# Patient Record
Sex: FEMALE | Race: WHITE | NOT HISPANIC OR LATINO | Employment: OTHER | URBAN - METROPOLITAN AREA
[De-identification: names, ages, dates, MRNs, and addresses within clinical notes are randomized per-mention and may not be internally consistent; named-entity substitution may affect disease eponyms.]

---

## 2023-07-13 PROBLEM — J45.909 AB (ASTHMATIC BRONCHITIS) (HHS-HCC): Status: ACTIVE | Noted: 2023-07-13
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
## 2023-07-13 PROBLEM — R39.9 UTI SYMPTOMS: Status: ACTIVE | Noted: 2023-07-13

## 2023-07-13 PROBLEM — R42 VERTIGO: Status: ACTIVE | Noted: 2023-07-13

## 2023-07-13 PROBLEM — H52.203 ASTIGMATISM, BILATERAL: Status: ACTIVE | Noted: 2023-07-13

## 2023-07-13 PROBLEM — N81.10 VAGINAL PROLAPSE: Status: ACTIVE | Noted: 2023-07-13

## 2023-07-13 PROBLEM — J45.40 ASTHMA, MODERATE PERSISTENT (HHS-HCC): Status: ACTIVE | Noted: 2023-07-13

## 2023-07-13 PROBLEM — H25.13 NUCLEAR SCLEROSIS OF BOTH EYES: Status: ACTIVE | Noted: 2023-07-13

## 2023-07-13 PROBLEM — R51.9 CEPHALGIA: Status: ACTIVE | Noted: 2023-07-13

## 2023-07-13 PROBLEM — J06.9 ACUTE URI: Status: ACTIVE | Noted: 2023-07-13

## 2023-07-13 PROBLEM — S81.811A LACERATION OF RIGHT LOWER EXTREMITY: Status: ACTIVE | Noted: 2023-07-13

## 2023-07-13 PROBLEM — N39.46 MIXED INCONTINENCE URGE AND STRESS: Status: ACTIVE | Noted: 2023-07-13

## 2023-07-13 PROBLEM — H91.93 HEARING LOSS, BILATERAL: Status: ACTIVE | Noted: 2023-07-13

## 2023-07-13 PROBLEM — R53.81 MALAISE AND FATIGUE: Status: ACTIVE | Noted: 2023-07-13

## 2023-07-13 PROBLEM — H91.90 HEARING IMPAIRMENT: Status: ACTIVE | Noted: 2023-07-13

## 2023-07-13 PROBLEM — H52.03 HYPERMETROPIA OF BOTH EYES: Status: ACTIVE | Noted: 2023-07-13

## 2023-07-13 PROBLEM — H43.813 PVD (POSTERIOR VITREOUS DETACHMENT), BOTH EYES: Status: ACTIVE | Noted: 2023-07-13

## 2023-07-13 PROBLEM — R35.0 URINARY FREQUENCY: Status: ACTIVE | Noted: 2023-07-13

## 2023-07-13 PROBLEM — H60.92 LEFT OTITIS EXTERNA: Status: ACTIVE | Noted: 2023-07-13

## 2023-07-13 PROBLEM — R04.0 EPISTAXIS: Status: ACTIVE | Noted: 2023-07-13

## 2023-07-13 PROBLEM — N95.8 GENITOURINARY SYNDROME OF MENOPAUSE: Status: ACTIVE | Noted: 2023-07-13

## 2023-07-13 PROBLEM — H25.013 CORTICAL AGE-RELATED CATARACT OF BOTH EYES: Status: ACTIVE | Noted: 2023-07-13

## 2023-07-13 PROBLEM — R10.2 PELVIC PAIN IN FEMALE: Status: ACTIVE | Noted: 2023-07-13

## 2023-07-13 PROBLEM — K02.9 TOOTH CARIES: Status: ACTIVE | Noted: 2023-07-13

## 2023-07-13 PROBLEM — R53.83 MALAISE AND FATIGUE: Status: ACTIVE | Noted: 2023-07-13

## 2023-07-13 PROBLEM — H52.4 BILATERAL PRESBYOPIA: Status: ACTIVE | Noted: 2023-07-13

## 2023-07-13 PROBLEM — N39.0 UTI (URINARY TRACT INFECTION): Status: ACTIVE | Noted: 2023-07-13

## 2023-07-13 PROBLEM — S06.0X0A CONCUSSION W/O COMA: Status: ACTIVE | Noted: 2023-07-13

## 2023-07-13 RX ORDER — ALBUTEROL SULFATE 90 UG/1
INHALANT RESPIRATORY (INHALATION)
COMMUNITY
End: 2024-04-02 | Stop reason: SDUPTHER

## 2023-07-13 RX ORDER — ESTRADIOL 0.1 MG/G
CREAM VAGINAL
COMMUNITY
Start: 2021-10-29

## 2023-07-13 RX ORDER — BUDESONIDE AND FORMOTEROL FUMARATE DIHYDRATE 160; 4.5 UG/1; UG/1
2 AEROSOL RESPIRATORY (INHALATION) 2 TIMES DAILY
COMMUNITY
End: 2024-04-02 | Stop reason: SDUPTHER

## 2023-07-13 RX ORDER — FEXOFENADINE HCL AND PSEUDOEPHEDRINE HCI 60; 120 MG/1; MG/1
TABLET, EXTENDED RELEASE ORAL
COMMUNITY

## 2023-07-14 ENCOUNTER — TELEMEDICINE (OUTPATIENT)
Dept: PRIMARY CARE | Facility: CLINIC | Age: 68
End: 2023-07-14
Payer: MEDICARE

## 2023-07-14 DIAGNOSIS — Z00.00 GENERAL MEDICAL EXAM: ICD-10-CM

## 2023-07-14 DIAGNOSIS — M81.0 OSTEOPOROSIS, UNSPECIFIED OSTEOPOROSIS TYPE, UNSPECIFIED PATHOLOGICAL FRACTURE PRESENCE: ICD-10-CM

## 2023-07-14 DIAGNOSIS — J01.90 ACUTE SINUSITIS, RECURRENCE NOT SPECIFIED, UNSPECIFIED LOCATION: Primary | ICD-10-CM

## 2023-07-14 PROCEDURE — 99213 OFFICE O/P EST LOW 20 MIN: CPT | Performed by: FAMILY MEDICINE

## 2023-07-14 RX ORDER — IBANDRONATE SODIUM 150 MG/1
150 TABLET, FILM COATED ORAL
Qty: 1 TABLET | Refills: 11 | Status: SHIPPED | OUTPATIENT
Start: 2023-07-14 | End: 2024-07-13

## 2023-07-14 RX ORDER — IBANDRONATE SODIUM 150 MG/1
150 TABLET, FILM COATED ORAL ONCE
Status: DISCONTINUED | OUTPATIENT
Start: 2023-07-14 | End: 2023-07-14

## 2023-07-14 RX ORDER — AZITHROMYCIN 250 MG/1
TABLET, FILM COATED ORAL
Qty: 6 TABLET | Refills: 0 | Status: SHIPPED | OUTPATIENT
Start: 2023-07-14 | End: 2023-07-19

## 2023-07-14 NOTE — PATIENT INSTRUCTIONS
I called in antibiotics for the sinus infection.  I prescribed Boniva for osteoporosis.  Labs were ordered to be done fasting.

## 2023-07-14 NOTE — PROGRESS NOTES
Subjective   Patient ID: [id number]   Virtual or Telephone Consent    An interactive audio and video telecommunication system which permits real time communications between the patient (at the originating site) and provider (at the distant site) was utilized to provide this telehealth service.   Verbal consent was requested and obtained from Nicolle Campbell on this date, 07/14/23 for a telehealth visit.     Nicolle Campbell is a 68 y.o. female who presents for a possible sinus infection.    HPI    She has had sinus drainage and sinus pain for the last two weeks.  She has pain in the gums and the ears.  The drainage is causing her to cough.  She has had a low grade fever.      She is requesting a different osteoporosis medication stating that the present one, alendronate upsets her stomach.    Objective     There were no vitals taken for this visit.     Physical Exam  Constitutional:       Appearance: Normal appearance.   Pulmonary:      Effort: Pulmonary effort is normal. No respiratory distress.   Neurological:      General: No focal deficit present.      Mental Status: She is alert and oriented to person, place, and time.         Assessment/Plan   Problem List Items Addressed This Visit    None  Visit Diagnoses       Acute sinusitis, recurrence not specified, unspecified location    -  Primary    Relevant Medications    azithromycin (Zithromax) 250 mg tablet    ibandronate (Boniva) 150 mg tablet    Other Relevant Orders    CBC and Auto Differential    Osteoporosis, unspecified osteoporosis type, unspecified pathological fracture presence        Relevant Medications    ibandronate (Boniva) tablet 150 mg (Start on 7/14/2023 10:45 AM)    General medical exam        Relevant Orders    Urinalysis with Reflex Microscopic    Thyroid Stimulating Hormone    Hemoglobin A1C    Lipid Panel    Comprehensive Metabolic Panel    CBC and Auto Differential          I called in antibiotics for the sinus infection.  I prescribed Boniva  for osteoporosis.  Labs were ordered to be done fasting.    Jacoby Conklin, DO

## 2023-09-08 ENCOUNTER — OFFICE VISIT (OUTPATIENT)
Dept: PRIMARY CARE | Facility: CLINIC | Age: 68
End: 2023-09-08
Payer: MEDICARE

## 2023-09-08 VITALS — DIASTOLIC BLOOD PRESSURE: 80 MMHG | SYSTOLIC BLOOD PRESSURE: 120 MMHG | TEMPERATURE: 98.5 F | OXYGEN SATURATION: 98 %

## 2023-09-08 DIAGNOSIS — J45.901 ACUTE EXACERBATION OF ASTHMA WITH ALLERGIC RHINITIS (HHS-HCC): Primary | ICD-10-CM

## 2023-09-08 PROCEDURE — 99214 OFFICE O/P EST MOD 30 MIN: CPT | Performed by: FAMILY MEDICINE

## 2023-09-08 PROCEDURE — 1159F MED LIST DOCD IN RCRD: CPT | Performed by: FAMILY MEDICINE

## 2023-09-08 PROCEDURE — 1160F RVW MEDS BY RX/DR IN RCRD: CPT | Performed by: FAMILY MEDICINE

## 2023-09-08 PROCEDURE — 1036F TOBACCO NON-USER: CPT | Performed by: FAMILY MEDICINE

## 2023-09-08 RX ORDER — METHYLPREDNISOLONE 4 MG/1
TABLET ORAL
Qty: 21 TABLET | Refills: 0 | Status: SHIPPED | OUTPATIENT
Start: 2023-09-08 | End: 2023-09-15

## 2023-09-08 RX ORDER — AZITHROMYCIN 250 MG/1
TABLET, FILM COATED ORAL
Qty: 6 TABLET | Refills: 0 | Status: SHIPPED | OUTPATIENT
Start: 2023-09-08 | End: 2023-09-13

## 2023-09-08 NOTE — PROGRESS NOTES
"Subjective   Patient ID: [id number]     Nicolle Campbell is a 68 y.o. female who presents for Asthma.  HPI  She complains of an asthma attack.  She has had this for a couple of days.  Has coughing, wheezing and shortness of breath.      Had bad SOB last night.  It was 'scary\" and she almost went to the ER overnight.    She denies any fever.          Objective     /80 (BP Location: Left arm, Patient Position: Sitting)   Temp 36.9 °C (98.5 °F)   SpO2 98%      Physical Exam  Constitutional:       Appearance: Normal appearance.   Cardiovascular:      Rate and Rhythm: Normal rate and regular rhythm.      Pulses: Normal pulses.      Heart sounds: Normal heart sounds.   Pulmonary:      Effort: Pulmonary effort is normal.   Abdominal:      General: Abdomen is flat.      Palpations: Abdomen is soft.   Musculoskeletal:         General: Normal range of motion.   Neurological:      Mental Status: She is alert.         Assessment/Plan   Problem List Items Addressed This Visit    None  Visit Diagnoses       Acute exacerbation of asthma with allergic rhinitis    -  Primary    Relevant Medications    methylPREDNISolone (Medrol Dospak) 4 mg tablets    azithromycin (Zithromax) 250 mg tablet    Other Relevant Orders    XR chest 2 views          I suggested that you go to the ER.  You do not want to go to the ER at this point.  A chest xray, antibiotic and steroid will be ordered.  Return if one week if the symptoms persist.  Go to the ER if they get any worse at all.   Jacoby Conklin, DO   "

## 2023-09-08 NOTE — PATIENT INSTRUCTIONS
I suggested that you go to the ER.  You do not want to go to the ER at this point.  A chest xray, antibiotic and steroid will be ordered.  Return if one week if the symptoms persist.  Go to the ER if they get any worse at all.

## 2023-09-11 ENCOUNTER — TELEPHONE (OUTPATIENT)
Dept: PRIMARY CARE | Facility: CLINIC | Age: 68
End: 2023-09-11
Payer: MEDICARE

## 2023-09-11 NOTE — TELEPHONE ENCOUNTER
Jacoby Conklin, DO to [place] [id number] Clinical Support Staff    Please let her know her chest xray did not show any significant abnormalities.

## 2023-11-07 DIAGNOSIS — J01.90 ACUTE SINUSITIS, RECURRENCE NOT SPECIFIED, UNSPECIFIED LOCATION: Primary | ICD-10-CM

## 2023-11-07 RX ORDER — AZITHROMYCIN 250 MG/1
TABLET, FILM COATED ORAL
Qty: 6 TABLET | Refills: 0 | Status: SHIPPED | OUTPATIENT
Start: 2023-11-07 | End: 2023-11-12

## 2023-12-06 ENCOUNTER — HOSPITAL ENCOUNTER (EMERGENCY)
Facility: HOSPITAL | Age: 68
Discharge: HOME | End: 2023-12-06
Payer: MEDICARE

## 2023-12-06 ENCOUNTER — APPOINTMENT (OUTPATIENT)
Dept: RADIOLOGY | Facility: HOSPITAL | Age: 68
End: 2023-12-06
Payer: MEDICARE

## 2023-12-06 VITALS — TEMPERATURE: 97.5 F | RESPIRATION RATE: 18 BRPM | HEART RATE: 80 BPM | OXYGEN SATURATION: 100 %

## 2023-12-06 LAB
ALBUMIN SERPL BCP-MCNC: 4.1 G/DL (ref 3.4–5)
ALP SERPL-CCNC: 86 U/L (ref 33–136)
ALT SERPL W P-5'-P-CCNC: 26 U/L (ref 7–45)
ANION GAP SERPL CALC-SCNC: 10 MMOL/L (ref 10–20)
APTT PPP: 31 SECONDS (ref 27–38)
AST SERPL W P-5'-P-CCNC: 26 U/L (ref 9–39)
BASOPHILS # BLD AUTO: 0.04 X10*3/UL (ref 0–0.1)
BASOPHILS NFR BLD AUTO: 0.5 %
BILIRUB SERPL-MCNC: 0.2 MG/DL (ref 0–1.2)
BNP SERPL-MCNC: 46 PG/ML (ref 0–99)
BUN SERPL-MCNC: 14 MG/DL (ref 6–23)
CALCIUM SERPL-MCNC: 9.9 MG/DL (ref 8.6–10.3)
CARDIAC TROPONIN I PNL SERPL HS: 4 NG/L (ref 0–13)
CHLORIDE SERPL-SCNC: 104 MMOL/L (ref 98–107)
CO2 SERPL-SCNC: 27 MMOL/L (ref 21–32)
CREAT SERPL-MCNC: 0.64 MG/DL (ref 0.5–1.05)
EOSINOPHIL # BLD AUTO: 1.19 X10*3/UL (ref 0–0.7)
EOSINOPHIL NFR BLD AUTO: 14.7 %
ERYTHROCYTE [DISTWIDTH] IN BLOOD BY AUTOMATED COUNT: 12.6 % (ref 11.5–14.5)
FLUAV RNA RESP QL NAA+PROBE: NOT DETECTED
FLUBV RNA RESP QL NAA+PROBE: NOT DETECTED
GFR SERPL CREATININE-BSD FRML MDRD: >90 ML/MIN/1.73M*2
GLUCOSE SERPL-MCNC: 92 MG/DL (ref 74–99)
HCT VFR BLD AUTO: 37.2 % (ref 36–46)
HGB BLD-MCNC: 12.4 G/DL (ref 12–16)
IMM GRANULOCYTES # BLD AUTO: 0.02 X10*3/UL (ref 0–0.7)
IMM GRANULOCYTES NFR BLD AUTO: 0.2 % (ref 0–0.9)
INR PPP: 1.1 (ref 0.9–1.1)
LYMPHOCYTES # BLD AUTO: 2.35 X10*3/UL (ref 1.2–4.8)
LYMPHOCYTES NFR BLD AUTO: 29 %
MCH RBC QN AUTO: 29.1 PG (ref 26–34)
MCHC RBC AUTO-ENTMCNC: 33.3 G/DL (ref 32–36)
MCV RBC AUTO: 87 FL (ref 80–100)
MONOCYTES # BLD AUTO: 0.59 X10*3/UL (ref 0.1–1)
MONOCYTES NFR BLD AUTO: 7.3 %
NEUTROPHILS # BLD AUTO: 3.91 X10*3/UL (ref 1.2–7.7)
NEUTROPHILS NFR BLD AUTO: 48.3 %
NRBC BLD-RTO: 0 /100 WBCS (ref 0–0)
PLATELET # BLD AUTO: 358 X10*3/UL (ref 150–450)
POTASSIUM SERPL-SCNC: 4.1 MMOL/L (ref 3.5–5.3)
PROT SERPL-MCNC: 6.7 G/DL (ref 6.4–8.2)
PROTHROMBIN TIME: 12 SECONDS (ref 9.8–12.8)
RBC # BLD AUTO: 4.26 X10*6/UL (ref 4–5.2)
RSV RNA RESP QL NAA+PROBE: NOT DETECTED
SARS-COV-2 RNA RESP QL NAA+PROBE: NOT DETECTED
SODIUM SERPL-SCNC: 137 MMOL/L (ref 136–145)
WBC # BLD AUTO: 8.1 X10*3/UL (ref 4.4–11.3)

## 2023-12-06 PROCEDURE — 71046 X-RAY EXAM CHEST 2 VIEWS: CPT | Performed by: RADIOLOGY

## 2023-12-06 PROCEDURE — 71046 X-RAY EXAM CHEST 2 VIEWS: CPT | Mod: FY

## 2023-12-06 PROCEDURE — 87637 SARSCOV2&INF A&B&RSV AMP PRB: CPT | Performed by: PHYSICIAN ASSISTANT

## 2023-12-06 PROCEDURE — 83880 ASSAY OF NATRIURETIC PEPTIDE: CPT | Performed by: PHYSICIAN ASSISTANT

## 2023-12-06 PROCEDURE — 85025 COMPLETE CBC W/AUTO DIFF WBC: CPT | Performed by: PHYSICIAN ASSISTANT

## 2023-12-06 PROCEDURE — 80048 BASIC METABOLIC PNL TOTAL CA: CPT | Performed by: PHYSICIAN ASSISTANT

## 2023-12-06 PROCEDURE — 84484 ASSAY OF TROPONIN QUANT: CPT | Performed by: PHYSICIAN ASSISTANT

## 2023-12-06 PROCEDURE — 80053 COMPREHEN METABOLIC PANEL: CPT | Performed by: PHYSICIAN ASSISTANT

## 2023-12-06 PROCEDURE — 99284 EMERGENCY DEPT VISIT MOD MDM: CPT

## 2023-12-06 PROCEDURE — 85730 THROMBOPLASTIN TIME PARTIAL: CPT | Performed by: PHYSICIAN ASSISTANT

## 2023-12-06 PROCEDURE — 85610 PROTHROMBIN TIME: CPT | Performed by: PHYSICIAN ASSISTANT

## 2023-12-06 PROCEDURE — 36415 COLL VENOUS BLD VENIPUNCTURE: CPT | Performed by: PHYSICIAN ASSISTANT

## 2023-12-06 PROCEDURE — 99283 EMERGENCY DEPT VISIT LOW MDM: CPT | Mod: 25

## 2023-12-06 ASSESSMENT — COLUMBIA-SUICIDE SEVERITY RATING SCALE - C-SSRS
1. IN THE PAST MONTH, HAVE YOU WISHED YOU WERE DEAD OR WISHED YOU COULD GO TO SLEEP AND NOT WAKE UP?: NO
6. HAVE YOU EVER DONE ANYTHING, STARTED TO DO ANYTHING, OR PREPARED TO DO ANYTHING TO END YOUR LIFE?: NO
2. HAVE YOU ACTUALLY HAD ANY THOUGHTS OF KILLING YOURSELF?: NO

## 2023-12-06 NOTE — ED TRIAGE NOTES
As provider-in-triage, I performed a medical screening history and physical exam on this patient.    HISTORY OF PRESENT ILLNESS:  68-year-old female presents today with a chief complaint of shortness of breath and cough.  Patient reports that she has had a productive cough now for about a week.  She states it is occasionally productive of yellow sputum.  Patient also feels like her throat feels swollen.  Patient denies any history of any CHF or COPD but that she has a history of asthma.  Patient states she had at home COVID test that were negative.    Vital Signs reviewed:  Heart Rate:  [80]   Temp:  [36.4 °C (97.5 °F)]   Resp:  [18]   SpO2:  [100 %]     BRIEF PHYSICAL EXAM:  No distress. Cardiac regular rate rhythm no murmur.  Decreased throughout.  Abdomen soft nontender.      MDM:  EKG, blood work, viral swabs, chest x-ray    I evaluated this patient in triage with the RN. Due to the patients complaint labs and or imaging were ordered by myself in an attempt to expedite patient care however I am not participating in care after evaluation. This is a preliminary assessment. Pt does not appear in acute distress at this time. They are stable and will have a full evaluation as soon as possible. They will be cared for by another provider who will possibly order more labs, imaging or interventions. Pt did not have a full ROS or PE completed by myself.

## 2023-12-06 NOTE — ED TRIAGE NOTES
Pt states cough, SOB, orthopnea for one week. Pt states hx of asthma, has taken treatments with no relief. Pt states cough is productive, yellow sputum. Pt denies any chest pain at this time, pt states swelling to L side of neck. Pt denies any swelling legs. Pt ambulatory in triage, no s/s of respiratory distress.

## 2023-12-07 ENCOUNTER — HOSPITAL ENCOUNTER (OUTPATIENT)
Dept: CARDIOLOGY | Facility: HOSPITAL | Age: 68
Discharge: HOME | End: 2023-12-07
Payer: MEDICARE

## 2023-12-07 PROCEDURE — 93005 ELECTROCARDIOGRAM TRACING: CPT

## 2023-12-08 ENCOUNTER — TELEPHONE (OUTPATIENT)
Dept: PRIMARY CARE | Facility: CLINIC | Age: 68
End: 2023-12-08
Payer: MEDICARE

## 2023-12-08 NOTE — TELEPHONE ENCOUNTER
She was in the ER 2 days ago and left before she was treated and discharged. She wants to know if you can look over the chest x-ray results. She thinks she needs an ATB. She wants to know if you can do anything.

## 2023-12-11 NOTE — TELEPHONE ENCOUNTER
I spoke with  Patricio. She did go to an urgent care over the weekend. He said they will call back to schedule follow up.

## 2024-01-07 LAB
ATRIAL RATE: 82 BPM
P AXIS: 77 DEGREES
PR INTERVAL: 129 MS
Q ONSET: 251 MS
QRS COUNT: 14 BEATS
QRS DURATION: 98 MS
QT INTERVAL: 400 MS
QTC CALCULATION(BAZETT): 468 MS
QTC FREDERICIA: 444 MS
R AXIS: 41 DEGREES
T AXIS: 62 DEGREES
T OFFSET: 451 MS
VENTRICULAR RATE: 82 BPM

## 2024-04-02 ENCOUNTER — TELEMEDICINE (OUTPATIENT)
Dept: PRIMARY CARE | Facility: CLINIC | Age: 69
End: 2024-04-02
Payer: MEDICARE

## 2024-04-02 DIAGNOSIS — J32.9 SINUSITIS, UNSPECIFIED CHRONICITY, UNSPECIFIED LOCATION: Primary | ICD-10-CM

## 2024-04-02 DIAGNOSIS — J45.40 MODERATE PERSISTENT ASTHMA, UNSPECIFIED WHETHER COMPLICATED (HHS-HCC): ICD-10-CM

## 2024-04-02 PROCEDURE — 99213 OFFICE O/P EST LOW 20 MIN: CPT | Performed by: FAMILY MEDICINE

## 2024-04-02 PROCEDURE — 1123F ACP DISCUSS/DSCN MKR DOCD: CPT | Performed by: FAMILY MEDICINE

## 2024-04-02 RX ORDER — ALBUTEROL SULFATE 90 UG/1
2 INHALANT RESPIRATORY (INHALATION) EVERY 6 HOURS PRN
Qty: 1 EACH | Refills: 0 | Status: SHIPPED | OUTPATIENT
Start: 2024-04-02

## 2024-04-02 RX ORDER — BUDESONIDE AND FORMOTEROL FUMARATE DIHYDRATE 160; 4.5 UG/1; UG/1
2 AEROSOL RESPIRATORY (INHALATION) 2 TIMES DAILY
Qty: 10.2 G | Refills: 0 | Status: SHIPPED | OUTPATIENT
Start: 2024-04-02

## 2024-04-02 RX ORDER — AZITHROMYCIN 250 MG/1
TABLET, FILM COATED ORAL
Qty: 6 TABLET | Refills: 0 | Status: SHIPPED | OUTPATIENT
Start: 2024-04-02 | End: 2024-04-07

## 2024-04-02 NOTE — PROGRESS NOTES
Chief complaint:   Chief Complaint   Patient presents with    Sinusitis       HPI:  Nicolle Campbell is a 68 y.o. female who presents for evaluation of 1.5 weeks of URI sx with cough, congestion, nasal drainage, and a temperature of 100F intermittently. She has facial pain and feels weak. She has a headache. She has taken Ibuprofen which helps somewhat. In the past 2 days has developed some wheezing which she gets with these types of illnesses approx every 6 mo.     Physical exam:  There were no vitals taken for this visit.  General: NAD, well appearing female, non labored breathing    Assessment/Plan   Problem List Items Addressed This Visit       Asthma, moderate persistent    Relevant Medications    albuterol sulfate (Proair Digihaler) 90 mcg/actuation aero powdr breath act w/sensor inhaler    budesonide-formoteroL (Symbicort) 160-4.5 mcg/actuation inhaler     Other Visit Diagnoses       Sinusitis, unspecified chronicity, unspecified location    -  Primary    Relevant Medications    azithromycin (Zithromax) 250 mg tablet        Refilled her Albuterol for PRN use every 4-6 hours as well as the Symbicort. Azithromycin ordered. Follow up 3-5 days, sooner as needed.     Clary Clements, DO

## 2024-06-28 ENCOUNTER — TELEMEDICINE (OUTPATIENT)
Dept: PRIMARY CARE | Facility: CLINIC | Age: 69
End: 2024-06-28
Payer: MEDICARE

## 2024-06-28 DIAGNOSIS — J45.901 ACUTE EXACERBATION OF ASTHMA WITH ALLERGIC RHINITIS (HHS-HCC): Primary | ICD-10-CM

## 2024-06-28 RX ORDER — AZITHROMYCIN 250 MG/1
TABLET, FILM COATED ORAL DAILY
Qty: 6 TABLET | Refills: 0 | Status: SHIPPED | OUTPATIENT
Start: 2024-06-28 | End: 2024-07-03

## 2024-06-28 NOTE — PROGRESS NOTES
Subjective   Patient ID: [id number]   Virtual or Telephone Consent    An interactive audio and video telecommunication system which permits real time communications between the patient (at the originating site) and provider (at the distant site) was utilized to provide this telehealth service.   Verbal consent was requested and obtained from Nicolle Campbell on this date, 06/28/24 for a telehealth visit.     Nicolle Campbell is a 69 y.o. female who presents for cough.  HPI  She has this for four days. She has been coughing, sneezing, wheezing.  Complains of ear pain.      She is using her inhaler.  It helps temporarily.      Objective     There were no vitals taken for this visit.     Physical Exam  Constitutional:       Appearance: Normal appearance.   Pulmonary:      Effort: No respiratory distress.      Breath sounds: Normal breath sounds.   Neurological:      Mental Status: She is alert.         Assessment/Plan   Problem List Items Addressed This Visit    None  Visit Diagnoses       Acute exacerbation of asthma with allergic rhinitis ([facility])    -  Primary    Relevant Medications    azithromycin (Zithromax) 250 mg tablet        I sent in an antibiotic.  Return in one week if this persists.  Continue to use your inhaler.     Jacoby Conklin, DO

## 2024-07-23 ENCOUNTER — TELEPHONE (OUTPATIENT)
Dept: PRIMARY CARE | Facility: CLINIC | Age: 69
End: 2024-07-23
Payer: MEDICARE

## 2024-07-23 DIAGNOSIS — J01.90 ACUTE SINUSITIS, RECURRENCE NOT SPECIFIED, UNSPECIFIED LOCATION: Primary | ICD-10-CM

## 2024-07-23 RX ORDER — AZITHROMYCIN 250 MG/1
TABLET, FILM COATED ORAL
Qty: 6 TABLET | Refills: 0 | Status: SHIPPED | OUTPATIENT
Start: 2024-07-23 | End: 2024-07-28

## 2024-09-20 ENCOUNTER — TELEMEDICINE (OUTPATIENT)
Dept: PRIMARY CARE | Facility: CLINIC | Age: 69
End: 2024-09-20
Payer: MEDICARE

## 2024-09-20 DIAGNOSIS — J45.21 MILD INTERMITTENT ASTHMATIC BRONCHITIS WITH ACUTE EXACERBATION (HHS-HCC): Primary | ICD-10-CM

## 2024-09-20 RX ORDER — AZITHROMYCIN 250 MG/1
TABLET, FILM COATED ORAL
Qty: 6 TABLET | Refills: 0 | Status: SHIPPED | OUTPATIENT
Start: 2024-09-20 | End: 2024-09-25

## 2024-09-20 ASSESSMENT — ENCOUNTER SYMPTOMS
COUGH: 1
HEADACHES: 1
FEVER: 1
RHINORRHEA: 1
SORE THROAT: 1
MYALGIAS: 1

## 2024-09-20 NOTE — PATIENT INSTRUCTIONS
I will order antibiotics. Steroids were offered but you do not want these at this time.  Return in one week if this persists for an in person appointment.

## 2024-09-20 NOTE — PROGRESS NOTES
Subjective   Patient ID: [id number]   Virtual or Telephone Consent    An interactive audio and video telecommunication system which permits real time communications between the patient (at the originating site) and provider (at the distant site) was utilized to provide this telehealth service.   Verbal consent was requested and obtained from Nicolle Campbell on this date, 09/20/24 for a telehealth visit.     Nicolle Campbell is a 69 y.o. female who presents for a cough.  Cough  This is a new problem. The current episode started in the past 7 days. The problem has been rapidly worsening. The problem occurs constantly. The cough is Non-productive. Associated symptoms include chest pain, a fever, headaches, myalgias, nasal congestion, postnasal drip, rhinorrhea and a sore throat.     She has had a bad cough.  She took a covid test that was negative.  This has been going on for about a week.      She had an asthma attack a couple of days ago.  She has been coughing and wheezing.          Objective     There were no vitals taken for this visit.     Physical Exam  HENT:      Nose: Congestion present.   Pulmonary:      Effort: No respiratory distress.      Breath sounds: Normal breath sounds.   Neurological:      General: No focal deficit present.      Mental Status: She is alert and oriented to person, place, and time. Mental status is at baseline.         Assessment/Plan   Problem List Items Addressed This Visit       AB (asthmatic bronchitis) ([facility]) - Primary    Relevant Medications    azithromycin (Zithromax) 250 mg tablet     I will order antibiotics. Steroids were offered but you do not want these at this time.  Return in one week if this persists for an in person appointment.    Jacoby Conklin, DO

## 2024-11-13 ENCOUNTER — OFFICE VISIT (OUTPATIENT)
Dept: URGENT CARE | Age: 69
End: 2024-11-13
Payer: MEDICARE

## 2024-11-13 VITALS
TEMPERATURE: 98.4 F | OXYGEN SATURATION: 98 % | BODY MASS INDEX: 18.48 KG/M2 | DIASTOLIC BLOOD PRESSURE: 88 MMHG | SYSTOLIC BLOOD PRESSURE: 147 MMHG | HEIGHT: 66 IN | WEIGHT: 115 LBS | HEART RATE: 77 BPM | RESPIRATION RATE: 17 BRPM

## 2024-11-13 DIAGNOSIS — J45.21 MILD INTERMITTENT EXACERBATION OF REACTIVE AIRWAY DISEASE (HHS-HCC): Primary | ICD-10-CM

## 2024-11-13 DIAGNOSIS — J01.90 ACUTE SINUSITIS, RECURRENCE NOT SPECIFIED, UNSPECIFIED LOCATION: ICD-10-CM

## 2024-11-13 PROCEDURE — 1123F ACP DISCUSS/DSCN MKR DOCD: CPT | Performed by: FAMILY MEDICINE

## 2024-11-13 PROCEDURE — 96372 THER/PROPH/DIAG INJ SC/IM: CPT | Performed by: FAMILY MEDICINE

## 2024-11-13 PROCEDURE — 1159F MED LIST DOCD IN RCRD: CPT | Performed by: FAMILY MEDICINE

## 2024-11-13 PROCEDURE — 1036F TOBACCO NON-USER: CPT | Performed by: FAMILY MEDICINE

## 2024-11-13 PROCEDURE — 99204 OFFICE O/P NEW MOD 45 MIN: CPT | Performed by: FAMILY MEDICINE

## 2024-11-13 PROCEDURE — 3008F BODY MASS INDEX DOCD: CPT | Performed by: FAMILY MEDICINE

## 2024-11-13 RX ORDER — METHYLPREDNISOLONE SODIUM SUCCINATE 125 MG/2ML
125 INJECTION INTRAMUSCULAR; INTRAVENOUS ONCE
Status: COMPLETED | OUTPATIENT
Start: 2024-11-13 | End: 2024-11-13

## 2024-11-13 ASSESSMENT — PATIENT HEALTH QUESTIONNAIRE - PHQ9
SUM OF ALL RESPONSES TO PHQ9 QUESTIONS 1 AND 2: 0
2. FEELING DOWN, DEPRESSED OR HOPELESS: NOT AT ALL
1. LITTLE INTEREST OR PLEASURE IN DOING THINGS: NOT AT ALL

## 2024-11-30 ENCOUNTER — APPOINTMENT (OUTPATIENT)
Dept: RADIOLOGY | Facility: HOSPITAL | Age: 69
End: 2024-11-30
Payer: MEDICARE

## 2024-11-30 ENCOUNTER — HOSPITAL ENCOUNTER (EMERGENCY)
Facility: HOSPITAL | Age: 69
Discharge: HOME | End: 2024-11-30
Attending: EMERGENCY MEDICINE
Payer: MEDICARE

## 2024-11-30 VITALS
OXYGEN SATURATION: 100 % | TEMPERATURE: 97.2 F | WEIGHT: 110 LBS | HEART RATE: 74 BPM | SYSTOLIC BLOOD PRESSURE: 133 MMHG | BODY MASS INDEX: 17.68 KG/M2 | DIASTOLIC BLOOD PRESSURE: 71 MMHG | HEIGHT: 66 IN | RESPIRATION RATE: 20 BRPM

## 2024-11-30 DIAGNOSIS — J98.11 COLLAPSE OF RIGHT LUNG: ICD-10-CM

## 2024-11-30 DIAGNOSIS — J18.9 PNEUMONIA OF RIGHT MIDDLE LOBE DUE TO INFECTIOUS ORGANISM: Primary | ICD-10-CM

## 2024-11-30 LAB
FLUAV RNA RESP QL NAA+PROBE: NOT DETECTED
FLUBV RNA RESP QL NAA+PROBE: NOT DETECTED
RSV RNA RESP QL NAA+PROBE: NOT DETECTED
SARS-COV-2 RNA RESP QL NAA+PROBE: NOT DETECTED

## 2024-11-30 PROCEDURE — 71250 CT THORAX DX C-: CPT | Performed by: RADIOLOGY

## 2024-11-30 PROCEDURE — 71046 X-RAY EXAM CHEST 2 VIEWS: CPT

## 2024-11-30 PROCEDURE — 71250 CT THORAX DX C-: CPT

## 2024-11-30 PROCEDURE — 71046 X-RAY EXAM CHEST 2 VIEWS: CPT | Mod: FOREIGN READ | Performed by: RADIOLOGY

## 2024-11-30 PROCEDURE — 99284 EMERGENCY DEPT VISIT MOD MDM: CPT | Mod: 25

## 2024-11-30 PROCEDURE — 87637 SARSCOV2&INF A&B&RSV AMP PRB: CPT | Performed by: EMERGENCY MEDICINE

## 2024-11-30 RX ORDER — LEVOFLOXACIN 500 MG/1
500 TABLET, FILM COATED ORAL DAILY
Qty: 10 TABLET | Refills: 0 | Status: SHIPPED | OUTPATIENT
Start: 2024-11-30 | End: 2024-12-10

## 2024-11-30 ASSESSMENT — COLUMBIA-SUICIDE SEVERITY RATING SCALE - C-SSRS
2. HAVE YOU ACTUALLY HAD ANY THOUGHTS OF KILLING YOURSELF?: NO
6. HAVE YOU EVER DONE ANYTHING, STARTED TO DO ANYTHING, OR PREPARED TO DO ANYTHING TO END YOUR LIFE?: NO
1. IN THE PAST MONTH, HAVE YOU WISHED YOU WERE DEAD OR WISHED YOU COULD GO TO SLEEP AND NOT WAKE UP?: NO

## 2024-11-30 NOTE — DISCHARGE INSTRUCTIONS
Your x-ray and CAT scan of the chest showed right middle lobe collapse of the lung I strongly advised to see a pulmonologist for a follow-up and outpatient bronchoscopy.

## 2024-11-30 NOTE — ED TRIAGE NOTES
Patient c/o cough and congestion for few weeks, seen at urgent care given medication but not improving. Patient states constant mucous thick yellow/green in color

## 2024-11-30 NOTE — ED PROVIDER NOTES
HPI   Chief Complaint   Patient presents with    Cough    Shortness of Breath     Patient c/o cough and congestion for few weeks, seen at urgent care given medication but not improving. Patient states constant mucous thick yellow/green in colo       HPI: []  69-year-old female with a history of asthma, hypertension comes with ongoing cough for the last few weeks.  Cough is productive of yellow-green sputum.  No chest pain pressure heaviness.  No fever no chills.  No PND no orthopnea.  No hemoptysis.  No recent travel hospitalization or sick contacts.  No abdominal pain nausea diarrhea fever chills incontinence seizures.  No change in meds status.  Good p.o. intake.  No lethargy.  No weight loss.  Not a smoker.    Past history: Asthma, hypertension,  Social: Patient denies current tobacco alcohol drug abuse.  REVIEW OF SYSTEMS:    GENERAL.: No weight loss, fatigue, anorexia, insomnia, fever.    EYES: No vision loss, double vision, drainage, eye pain.    ENT: No pharyngitis, dry mouth.    CARDIOPULMONARY: No chest pain, palpitations, syncope, near syncope.  Positive for shortness of breath, positive cough, hemoptysis.    GI: No abdominal pain, change in bowel habits, melena, hematemesis, hematochezia, nausea, vomiting, diarrhea.    : No discharge, dysuria, frequency, urgency, hematuria.    MS: No limb pain, joint pain, joint swelling.    SKIN: No rashes.    PSYCH: No depression, anxiety, suicidality, homicidality.    Review of systems is otherwise negative unless stated above or in history of present illness.  Social history, family history, allergies reviewed.  PHYSICAL EXAM:    GENERAL: Vitals noted, no distress. Alert and oriented  x 3. Non-toxic.      EENT: TMs clear. Posterior oropharynx unremarkable. No meningismus. No LAD.     NECK: Supple. Nontender. No midline tenderness.     CARDIAC: Regular, rate, rhythm. No murmurs rubs or gallops. No JVD    PULMONARY: Lungs clear bilaterally with good aeration. No  wheezes rales or rhonchi. No respiratory distress.  Right basilar crackles, no tachypnea stridor or retractions able to speak in full sentences    ABDOMEN: Soft, nonsurgical. Nontender. No peritoneal signs. Normoactive bowel sounds. No pulsatile masses.     EXTREMITIES: No peripheral edema. Negative Homans bilaterally, no cords.  2+ bounding pulses well-perfused.    SKIN: No rash. Intact.     NEURO: No focal neurologic deficits, NIH score of 0. Cranial nerves normal as tested from II through XII.     MEDICAL DECISION MAKING:  Influenza COVID RSV negative.  Chest x-ray and CT of the chest showed right middle lobe collapse/infiltrate please refer to radiology report.    Treatment ED: None  ED course: Patient remains asymptomatic and afebrile normotensive nontachycardic hypoxia patient made aware of the abnormal CAT scan findings.  Impression: #1 community-acquired pneumonia, #2 right middle lobe collapse  Plan set MDM: 69-year-old white female history of asthma comes in with ongoing cough and shortness of breath workup is concerning for right middle lobe pneumonia with  right middle lobe collapse.  Currently patient afebrile normotensive no tachycardia hypoxia.,  Patient made aware of the abnormal CAT scan findings.  Low concern for lung cancer or respiratory failure or pulm embolism, patient be discharged home with levofloxacin for 10 days, advised urgent option follow-up with primary care doctor and pulmonology for outpatient bronchoscopy with strict return precaution.              Patient History   Past Medical History:   Diagnosis Date    Acute sinusitis, unspecified 07/19/2018    Acute sinusitis    Acute upper respiratory infection, unspecified 07/19/2018    URI (upper respiratory infection)    Concussion without loss of consciousness, initial encounter 07/19/2018    Concussion w/o coma    Dizziness and giddiness 07/19/2018    Vertigo    Encounter for immunization 12/20/2022    Need for vaccination    Epistaxis  07/19/2018    Epistaxis    Frequency of micturition 07/19/2018    Urinary frequency    Headache 07/19/2018    Cephalgia    Laceration without foreign body, right lower leg, initial encounter 07/19/2018    Laceration of right lower extremity, initial encounter    Moderate persistent asthma, uncomplicated ([facility]) 07/19/2018    Asthma, moderate persistent    Other allergy status, other than to drugs and biological substances     Environmental allergies    Other malaise 07/19/2018    Malaise and fatigue    Personal history of other diseases of the respiratory system 02/01/2018    History of upper respiratory infection    Personal history of other diseases of the respiratory system 08/16/2021    History of acute sinusitis    Personal history of other diseases of the respiratory system 07/09/2015    History of asthma    Personal history of other specified conditions 07/09/2015    History of abnormal Pap smear    Unspecified fall, initial encounter 06/07/2016    Fall    Unspecified hearing loss, bilateral 07/19/2018    Hearing loss, bilateral    Unspecified otitis externa, left ear 03/19/2021    Left otitis externa    Urinary tract infection, site not specified 07/19/2018    UTI (urinary tract infection)     Past Surgical History:   Procedure Laterality Date    OTHER SURGICAL HISTORY  06/29/2021    Cataract surgery     Family History   Problem Relation Name Age of Onset    No Known Problems Mother      Diabetes Father      Diabetes Daughter       Social History     Tobacco Use    Smoking status: Never    Smokeless tobacco: Never   Substance Use Topics    Alcohol use: Not on file    Drug use: Not on file       Physical Exam   ED Triage Vitals [11/30/24 1025]   Temperature Heart Rate Respirations BP   36.2 °C (97.2 °F) 90 20 (!) 142/92      Pulse Ox Temp Source Heart Rate Source Patient Position   99 % Temporal Monitor Sitting      BP Location FiO2 (%)     Right arm --       Physical Exam      ED Course & [facility]   ED Course  as of 11/30/24 1321   Sat Nov 30, 2024   1313 Chest x-ray and CT of the chest are concerning for slight right middle lobe collapse, patient currently is afebrile normotensive no tachycardia hypoxia will be discharged home levofloxacin for 10 days, patient made aware of the abnormal CAT scan findings, advised urgent outpatient follow-up pulmonology for outpatient bronchoscopy with strict return precaution. [MT]      ED Course User Index  [MT] Frank Castillo MD         Diagnoses as of 11/30/24 1321   Pneumonia of right middle lobe due to infectious organism   Collapse of right lung                 No data recorded                                 Medical Decision Making      Procedure  Procedures     Frank Castillo MD  11/30/24 1323

## 2024-12-02 ENCOUNTER — APPOINTMENT (OUTPATIENT)
Dept: PULMONOLOGY | Facility: CLINIC | Age: 69
End: 2024-12-02
Payer: COMMERCIAL

## 2024-12-04 ENCOUNTER — OFFICE VISIT (OUTPATIENT)
Dept: PULMONOLOGY | Facility: CLINIC | Age: 69
End: 2024-12-04
Payer: MEDICARE

## 2024-12-04 VITALS
HEART RATE: 83 BPM | TEMPERATURE: 97.7 F | OXYGEN SATURATION: 99 % | SYSTOLIC BLOOD PRESSURE: 132 MMHG | DIASTOLIC BLOOD PRESSURE: 78 MMHG | BODY MASS INDEX: 17.75 KG/M2 | HEIGHT: 66 IN

## 2024-12-04 DIAGNOSIS — J18.9 PNEUMONIA OF RIGHT MIDDLE LOBE DUE TO INFECTIOUS ORGANISM: ICD-10-CM

## 2024-12-04 DIAGNOSIS — J45.40 MODERATE PERSISTENT ASTHMA, UNSPECIFIED WHETHER COMPLICATED (HHS-HCC): Primary | ICD-10-CM

## 2024-12-04 DIAGNOSIS — J98.11 COLLAPSE OF RIGHT LUNG: ICD-10-CM

## 2024-12-04 PROCEDURE — 1126F AMNT PAIN NOTED NONE PRSNT: CPT | Performed by: INTERNAL MEDICINE

## 2024-12-04 PROCEDURE — 1123F ACP DISCUSS/DSCN MKR DOCD: CPT | Performed by: INTERNAL MEDICINE

## 2024-12-04 PROCEDURE — 1159F MED LIST DOCD IN RCRD: CPT | Performed by: INTERNAL MEDICINE

## 2024-12-04 PROCEDURE — 99204 OFFICE O/P NEW MOD 45 MIN: CPT | Performed by: INTERNAL MEDICINE

## 2024-12-04 ASSESSMENT — ENCOUNTER SYMPTOMS
DEPRESSION: 0
LOSS OF SENSATION IN FEET: 0
OCCASIONAL FEELINGS OF UNSTEADINESS: 0

## 2024-12-04 ASSESSMENT — PAIN SCALES - GENERAL: PAINLEVEL_OUTOF10: 0-NO PAIN

## 2024-12-04 ASSESSMENT — PATIENT HEALTH QUESTIONNAIRE - PHQ9
1. LITTLE INTEREST OR PLEASURE IN DOING THINGS: NOT AT ALL
SUM OF ALL RESPONSES TO PHQ9 QUESTIONS 1 AND 2: 0
2. FEELING DOWN, DEPRESSED OR HOPELESS: NOT AT ALL

## 2024-12-05 PROBLEM — J98.11 COLLAPSE OF RIGHT LUNG: Status: ACTIVE | Noted: 2024-12-05

## 2024-12-05 PROBLEM — J18.9 PNEUMONIA OF RIGHT MIDDLE LOBE DUE TO INFECTIOUS ORGANISM: Status: ACTIVE | Noted: 2024-12-05

## 2024-12-05 RX ORDER — ALBUTEROL SULFATE 0.83 MG/ML
3 SOLUTION RESPIRATORY (INHALATION) ONCE
OUTPATIENT
Start: 2024-12-05 | End: 2024-12-05

## 2024-12-05 RX ORDER — ALBUTEROL SULFATE 90 UG/1
1 INHALANT RESPIRATORY (INHALATION) ONCE
OUTPATIENT
Start: 2024-12-05

## 2024-12-06 ENCOUNTER — APPOINTMENT (OUTPATIENT)
Dept: LAB | Facility: LAB | Age: 69
End: 2024-12-06
Payer: MEDICARE

## 2024-12-06 LAB
BACTERIA SPEC RESP CULT: ABNORMAL
GRAM STN SPEC: ABNORMAL
GRAM STN SPEC: ABNORMAL

## 2024-12-06 PROCEDURE — 87205 SMEAR GRAM STAIN: CPT

## 2024-12-12 ENCOUNTER — TELEMEDICINE (OUTPATIENT)
Dept: PRIMARY CARE | Facility: CLINIC | Age: 69
End: 2024-12-12
Payer: MEDICARE

## 2024-12-12 DIAGNOSIS — R11.2 NAUSEA AND VOMITING, UNSPECIFIED VOMITING TYPE: ICD-10-CM

## 2024-12-12 DIAGNOSIS — J18.9 PNEUMONIA OF RIGHT MIDDLE LOBE DUE TO INFECTIOUS ORGANISM: Primary | ICD-10-CM

## 2024-12-12 PROCEDURE — 1036F TOBACCO NON-USER: CPT | Performed by: FAMILY MEDICINE

## 2024-12-12 PROCEDURE — 99213 OFFICE O/P EST LOW 20 MIN: CPT | Performed by: FAMILY MEDICINE

## 2024-12-12 PROCEDURE — 1160F RVW MEDS BY RX/DR IN RCRD: CPT | Performed by: FAMILY MEDICINE

## 2024-12-12 PROCEDURE — 1159F MED LIST DOCD IN RCRD: CPT | Performed by: FAMILY MEDICINE

## 2024-12-12 PROCEDURE — 1123F ACP DISCUSS/DSCN MKR DOCD: CPT | Performed by: FAMILY MEDICINE

## 2024-12-12 RX ORDER — ONDANSETRON 4 MG/1
4 TABLET, FILM COATED ORAL EVERY 8 HOURS PRN
Qty: 20 TABLET | Refills: 0 | Status: SHIPPED | OUTPATIENT
Start: 2024-12-12 | End: 2024-12-19

## 2024-12-12 NOTE — PROGRESS NOTES
Subjective   Patient ID: [id number]   Virtual or Telephone Consent    An interactive audio and video telecommunication system which permits real time communications between the patient (at the originating site) and provider (at the distant site) was utilized to provide this telehealth service.   Verbal consent was requested and obtained from Nicolle Campbell on this date, 12/12/24 for a telehealth visit.     Nicolle Campbell is a 69 y.o. female who presents for vomiting.  HPI  She complains of vomiting.    She went to the ER two weeks ago on a Saturday.  She was found to have RML pnsumonia.  And she was told there was a collapse of the right middle lobe.  She was referred to pulmonology.  She saw pulmonology on 12/4/24.  Report reviewed.    She was treated with Levaquin.    She started to have vomiting starting at 4 am this morning.      She is scheduled for a repeat CT chest in January and then she recommends a bronchoscopy if the CT findings persist.       Objective     There were no vitals taken for this visit.     Physical Exam  Constitutional:       General: She is not in acute distress.     Appearance: Normal appearance. She is not toxic-appearing.   Pulmonary:      Effort: Pulmonary effort is normal. No respiratory distress.   Neurological:      General: No focal deficit present.      Mental Status: She is alert. Mental status is at baseline.         Assessment/Plan   Problem List Items Addressed This Visit       Pneumonia of right middle lobe due to infectious organism - Primary     Other Visit Diagnoses       Nausea and vomiting, unspecified vomiting type        Relevant Medications    ondansetron (Zofran) 4 mg tablet            I will prescribe a nausea medication to help slow down your nausea and vomiting.  It may be that the nausea is a side effect of your antibiotic.  Return in one week for an in person appointment.  Return sooner if you have any new or worsened problems.  Follow up with pulmonology as  recommended for the repeat CT chest and the bronchoscopy if needed.    Jacoby Conklin, DO

## 2024-12-12 NOTE — PATIENT INSTRUCTIONS
I will prescribe a nausea medication to help slow down your nausea and vomiting.  It may be that the nausea is a side effect of your antibiotic.  Return in one week for an in person appointment.  Return sooner if you have any new or worsened problems.  Follow up with pulmonology as recommended for the repeat CT chest and the bronchoscopy if needed.

## 2025-01-07 ENCOUNTER — APPOINTMENT (OUTPATIENT)
Dept: PRIMARY CARE | Facility: CLINIC | Age: 70
End: 2025-01-07
Payer: MEDICARE

## 2025-01-07 VITALS — TEMPERATURE: 97.9 F | SYSTOLIC BLOOD PRESSURE: 118 MMHG | DIASTOLIC BLOOD PRESSURE: 62 MMHG

## 2025-01-07 DIAGNOSIS — Z78.0 ASYMPTOMATIC MENOPAUSAL STATE: ICD-10-CM

## 2025-01-07 DIAGNOSIS — J98.11 COLLAPSE OF RIGHT LUNG: ICD-10-CM

## 2025-01-07 DIAGNOSIS — Z12.11 SCREENING FOR COLORECTAL CANCER: ICD-10-CM

## 2025-01-07 DIAGNOSIS — Z00.00 ROUTINE GENERAL MEDICAL EXAMINATION AT HEALTH CARE FACILITY: Primary | ICD-10-CM

## 2025-01-07 DIAGNOSIS — Z12.31 ENCOUNTER FOR SCREENING MAMMOGRAM FOR BREAST CANCER: ICD-10-CM

## 2025-01-07 DIAGNOSIS — Z12.12 SCREENING FOR COLORECTAL CANCER: ICD-10-CM

## 2025-01-07 DIAGNOSIS — J45.40 MODERATE PERSISTENT ASTHMA, UNSPECIFIED WHETHER COMPLICATED (HHS-HCC): ICD-10-CM

## 2025-01-07 DIAGNOSIS — M81.0 OSTEOPOROSIS, UNSPECIFIED OSTEOPOROSIS TYPE, UNSPECIFIED PATHOLOGICAL FRACTURE PRESENCE: ICD-10-CM

## 2025-01-07 DIAGNOSIS — Z23 NEED FOR VACCINATION: ICD-10-CM

## 2025-01-07 DIAGNOSIS — R73.9 HYPERGLYCEMIA: ICD-10-CM

## 2025-01-07 PROCEDURE — 1170F FXNL STATUS ASSESSED: CPT | Performed by: FAMILY MEDICINE

## 2025-01-07 PROCEDURE — 1160F RVW MEDS BY RX/DR IN RCRD: CPT | Performed by: FAMILY MEDICINE

## 2025-01-07 PROCEDURE — 1158F ADVNC CARE PLAN TLK DOCD: CPT | Performed by: FAMILY MEDICINE

## 2025-01-07 PROCEDURE — 1036F TOBACCO NON-USER: CPT | Performed by: FAMILY MEDICINE

## 2025-01-07 PROCEDURE — 90677 PCV20 VACCINE IM: CPT | Performed by: FAMILY MEDICINE

## 2025-01-07 PROCEDURE — 1123F ACP DISCUSS/DSCN MKR DOCD: CPT | Performed by: FAMILY MEDICINE

## 2025-01-07 PROCEDURE — G0009 ADMIN PNEUMOCOCCAL VACCINE: HCPCS | Performed by: FAMILY MEDICINE

## 2025-01-07 PROCEDURE — 1159F MED LIST DOCD IN RCRD: CPT | Performed by: FAMILY MEDICINE

## 2025-01-07 PROCEDURE — G0439 PPPS, SUBSEQ VISIT: HCPCS | Performed by: FAMILY MEDICINE

## 2025-01-07 ASSESSMENT — PATIENT HEALTH QUESTIONNAIRE - PHQ9
2. FEELING DOWN, DEPRESSED OR HOPELESS: NOT AT ALL
SUM OF ALL RESPONSES TO PHQ9 QUESTIONS 1 AND 2: 0
1. LITTLE INTEREST OR PLEASURE IN DOING THINGS: NOT AT ALL

## 2025-01-07 ASSESSMENT — ENCOUNTER SYMPTOMS
COUGH: 1
DYSURIA: 0
CONSTIPATION: 0
WHEEZING: 1
MYALGIAS: 0
FATIGUE: 0
PALPITATIONS: 0
SHORTNESS OF BREATH: 1
FEVER: 0
RHINORRHEA: 1
ARTHRALGIAS: 0
LOSS OF SENSATION IN FEET: 0
WOUND: 0
HEADACHES: 0
NUMBNESS: 0
DYSPHORIC MOOD: 0
NAUSEA: 0
SINUS PRESSURE: 0
DIZZINESS: 0
DEPRESSION: 0
ABDOMINAL PAIN: 0
VOMITING: 0
OCCASIONAL FEELINGS OF UNSTEADINESS: 0
WEAKNESS: 0
FREQUENCY: 0
VOICE CHANGE: 0
SORE THROAT: 0
BACK PAIN: 0
BLOOD IN STOOL: 0
NERVOUS/ANXIOUS: 0
ROS SKIN COMMENTS: NO MOLES GROWING OR CHANGING.
DIARRHEA: 0
SLEEP DISTURBANCE: 0
ADENOPATHY: 0
HEMATURIA: 0

## 2025-01-07 ASSESSMENT — ACTIVITIES OF DAILY LIVING (ADL)
BATHING: INDEPENDENT
MANAGING_FINANCES: INDEPENDENT
DOING_HOUSEWORK: INDEPENDENT
DRESSING: INDEPENDENT
TAKING_MEDICATION: INDEPENDENT
GROCERY_SHOPPING: INDEPENDENT

## 2025-01-07 NOTE — PROGRESS NOTES
Subjective   Reason for Visit: Nicolle Campbell is an 69 y.o. female here for a Medicare Wellness visit.   She was in the ER.  11/30/24 for SOB.  Had a CT showing lobar collapse probably from a mucous plug.  She was given Levaquin.  She completed this and is feeling better but she still has ongoing coughing and SOB typical for her asthma.        Reviewed all medications by prescribing practitioner or clinical pharmacist (such as prescriptions, OTCs, herbal therapies and supplements) and documented in the medical record.  Current Outpatient Medications on File Prior to Visit   Medication Sig Dispense Refill    albuterol sulfate (Proair Digihaler) 90 mcg/actuation aero powdr breath act w/sensor inhaler Inhale 2 puffs every 6 hours if needed for wheezing. 1 each 0    budesonide-formoteroL (Symbicort) 160-4.5 mcg/actuation inhaler Inhale 2 puffs 2 times a day. 10.2 g 0    estradiol (Estrace) 0.01 % (0.1 mg/gram) vaginal cream Insert into the vagina.      fexofenadine-pseudoephedrine (Allegra-D)  mg 12 hr tablet Take by mouth.      ibandronate (Boniva) 150 mg tablet Take 1 tablet (150 mg) by mouth every 30 (thirty) days. Take in morning with full glass of water on an empty stomach. No food, drink, meds, or lying down for 60 minutes after. 1 tablet 11     No current facility-administered medications on file prior to visit.       HPI  Tobacco Use: Low Risk  (1/7/2025)    Patient History     Smoking Tobacco Use: Never     Smokeless Tobacco Use: Never     Passive Exposure: Not on file   No alcohol.  No  drug use.     Does not have advanced directives.  Full code.  POA would be first daughter Ninoska, then second , Patricio.    She does exercise regularly.  She is working at [employer]   Patient Care Team:  Jacoby Conklin DO as PCP - General  Jacoby Conklin DO as PCP - [employer] PCP     Review of Systems   Constitutional:  Negative for fatigue and fever.   HENT:  Positive for rhinorrhea.  Negative for sinus pressure, sore throat and voice change.    Respiratory:  Positive for cough, shortness of breath and wheezing.    Cardiovascular:  Negative for chest pain, palpitations and leg swelling.   Gastrointestinal:  Negative for abdominal pain, blood in stool, constipation, diarrhea, nausea and vomiting.   Genitourinary:  Negative for dysuria, frequency, hematuria and vaginal bleeding.   Musculoskeletal:  Negative for arthralgias, back pain and myalgias.   Skin:  Negative for rash and wound.        No moles growing or changing.   Neurological:  Negative for dizziness, syncope, weakness, numbness and headaches.   Hematological:  Negative for adenopathy.   Psychiatric/Behavioral:  Negative for dysphoric mood, self-injury, sleep disturbance and suicidal ideas. The patient is not nervous/anxious.      She has never had a colonoscopy.  Objective   Vitals:  /62 (BP Location: Right arm, Patient Position: Sitting)   Temp 36.6 °C (97.9 °F)       Physical Exam  Vitals reviewed.   Constitutional:       General: She is not in acute distress.     Appearance: Normal appearance. She is not ill-appearing or toxic-appearing.   HENT:      Head: Normocephalic and atraumatic.      Right Ear: Tympanic membrane, ear canal and external ear normal.      Left Ear: Tympanic membrane, ear canal and external ear normal.      Nose: Nose normal.      Mouth/Throat:      Mouth: Mucous membranes are moist.   Eyes:      Extraocular Movements: Extraocular movements intact.      Conjunctiva/sclera: Conjunctivae normal.      Pupils: Pupils are equal, round, and reactive to light.   Cardiovascular:      Rate and Rhythm: Normal rate and regular rhythm.      Heart sounds: No murmur heard.  Pulmonary:      Effort: Pulmonary effort is normal.      Breath sounds: Normal breath sounds.   Abdominal:      General: Bowel sounds are normal. There is no distension.      Palpations: Abdomen is soft. There is no mass.      Tenderness: There is no  abdominal tenderness. There is no guarding or rebound.   Musculoskeletal:         General: No tenderness.      Cervical back: Neck supple.      Right lower leg: No edema.      Left lower leg: No edema.   Skin:     Coloration: Skin is not jaundiced or pale.      Findings: Rash (dry skin.) present. No lesion.   Neurological:      General: No focal deficit present.      Mental Status: She is alert and oriented to person, place, and time. Mental status is at baseline.   Psychiatric:         Mood and Affect: Mood normal.         Behavior: Behavior normal.         Thought Content: Thought content normal.         Judgment: Judgment normal.         Assessment & Plan  Routine general medical examination at health care facility    Orders:    1 Year Follow Up In Primary Care - Wellness Exam; Future    Need for vaccination    Orders:    Pneumococcal vaccine 20-valent    Asymptomatic menopausal state    Orders:    XR DEXA bone density; Future    Encounter for screening mammogram for breast cancer    Orders:    BI mammo bilateral screening tomosynthesis; Future    Screening for colorectal cancer    Orders:    Colonoscopy Screening; Average Risk Patient; Future    Osteoporosis, unspecified osteoporosis type, unspecified pathological fracture presence         Moderate persistent asthma, unspecified whether complicated (HHS-HCC)    Orders:    Urinalysis with Reflex Microscopic; Future    CBC and Auto Differential; Future    Collapse of right lung    Orders:    Urinalysis with Reflex Microscopic; Future    Comprehensive Metabolic Panel; Future    CBC and Auto Differential; Future    Hyperglycemia    Orders:    Hemoglobin A1C; Future         Annual Wellness exam completed   Preventive Health history reviewed:  Labs ordered    Mammogram ordered  BMD ordered  Cscope ordered    Low dose CT chest for lung cancer screening not indicated.  Never a smoker.  However, a CT chest was ordered to recheck a lobar collapse likely from a mucous  kojo.  She states this is scheduled 1/22/25.  Depression Screening done  Advanced Directives Discussion Completed  Cardiovascular risk discussed and if needed, lifestyle modifications recommended, including nutritional choices, exercise, and elimination of habits contributing to risk.  We agreed on a plan to reduce the current cardiovascular risk.  See ecalc ASCVD Risk  Plus for data discussed regarding risk and risk reduction opportunities.  Aspirin use is not recommended after reviewing the updated guidelines.   Vaccines:  Influenza recommended at the pharmacy (we are out of this)  Prevnar 20 ordered.  Pneumovax 23 done 2020  Shingrix recommended at the pharmacy.  RSV recommended at the pharmacy.      I ordered labs, a mammogram, a bone density scan, a colonoscopy and a Prevnar 20 vaccination.  Labs were ordered to be done at [facility].  You are scheduled for a recheck on the lobar collapse in your lung possibly from a mucous plug.  Get that CT as scheduled.  Return in three months for a recheck.  Continue your same medications.

## 2025-01-07 NOTE — PATIENT INSTRUCTIONS
I ordered labs, a mammogram, a bone density scan, a colonoscopy and a Prevnar 20 vaccination.  Labs were ordered to be done at [facility].  You are scheduled for a recheck on the lobar collapse in your lung possibly from a mucous plug.  Get that CT as scheduled.  Return in three months for a recheck.  Continue your same medications.

## 2025-01-07 NOTE — ASSESSMENT & PLAN NOTE
Orders:    Urinalysis with Reflex Microscopic; Future    Comprehensive Metabolic Panel; Future    CBC and Auto Differential; Future

## 2025-01-22 ENCOUNTER — HOSPITAL ENCOUNTER (OUTPATIENT)
Dept: RADIOLOGY | Facility: HOSPITAL | Age: 70
Discharge: HOME | End: 2025-01-22
Payer: MEDICARE

## 2025-01-22 DIAGNOSIS — J98.11 COLLAPSE OF RIGHT LUNG: ICD-10-CM

## 2025-01-22 DIAGNOSIS — J18.9 PNEUMONIA OF RIGHT MIDDLE LOBE DUE TO INFECTIOUS ORGANISM: ICD-10-CM

## 2025-01-22 PROCEDURE — 71250 CT THORAX DX C-: CPT

## 2025-01-22 PROCEDURE — 71250 CT THORAX DX C-: CPT | Performed by: RADIOLOGY

## 2025-01-25 ENCOUNTER — LAB (OUTPATIENT)
Dept: LAB | Facility: LAB | Age: 70
End: 2025-01-25
Payer: MEDICARE

## 2025-01-25 DIAGNOSIS — J45.40 MODERATE PERSISTENT ASTHMA, UNSPECIFIED WHETHER COMPLICATED (HHS-HCC): ICD-10-CM

## 2025-01-25 DIAGNOSIS — J98.11 COLLAPSE OF RIGHT LUNG: ICD-10-CM

## 2025-01-25 DIAGNOSIS — R73.9 HYPERGLYCEMIA: ICD-10-CM

## 2025-01-25 LAB
ALBUMIN SERPL BCP-MCNC: 4 G/DL (ref 3.4–5)
ALP SERPL-CCNC: 100 U/L (ref 33–136)
ALT SERPL W P-5'-P-CCNC: 19 U/L (ref 7–45)
ANION GAP SERPL CALC-SCNC: 14 MMOL/L (ref 10–20)
APPEARANCE UR: CLEAR
AST SERPL W P-5'-P-CCNC: 28 U/L (ref 9–39)
BASOPHILS # BLD AUTO: 0.07 X10*3/UL (ref 0–0.1)
BASOPHILS NFR BLD AUTO: 0.9 %
BILIRUB SERPL-MCNC: 0.3 MG/DL (ref 0–1.2)
BILIRUB UR STRIP.AUTO-MCNC: NEGATIVE MG/DL
BUN SERPL-MCNC: 16 MG/DL (ref 6–23)
CALCIUM SERPL-MCNC: 9.4 MG/DL (ref 8.6–10.3)
CHLORIDE SERPL-SCNC: 102 MMOL/L (ref 98–107)
CO2 SERPL-SCNC: 26 MMOL/L (ref 21–32)
COLOR UR: YELLOW
CREAT SERPL-MCNC: 0.83 MG/DL (ref 0.5–1.05)
EGFRCR SERPLBLD CKD-EPI 2021: 76 ML/MIN/1.73M*2
EOSINOPHIL # BLD AUTO: 1.15 X10*3/UL (ref 0–0.7)
EOSINOPHIL NFR BLD AUTO: 14.5 %
ERYTHROCYTE [DISTWIDTH] IN BLOOD BY AUTOMATED COUNT: 14.1 % (ref 11.5–14.5)
EST. AVERAGE GLUCOSE BLD GHB EST-MCNC: 128 MG/DL
GLUCOSE SERPL-MCNC: 88 MG/DL (ref 74–99)
GLUCOSE UR STRIP.AUTO-MCNC: NORMAL MG/DL
HBA1C MFR BLD: 6.1 %
HCT VFR BLD AUTO: 36.1 % (ref 36–46)
HGB BLD-MCNC: 11.5 G/DL (ref 12–16)
IMM GRANULOCYTES # BLD AUTO: 0.01 X10*3/UL (ref 0–0.7)
IMM GRANULOCYTES NFR BLD AUTO: 0.1 % (ref 0–0.9)
KETONES UR STRIP.AUTO-MCNC: NEGATIVE MG/DL
LEUKOCYTE ESTERASE UR QL STRIP.AUTO: NEGATIVE
LYMPHOCYTES # BLD AUTO: 1.84 X10*3/UL (ref 1.2–4.8)
LYMPHOCYTES NFR BLD AUTO: 23.3 %
MCH RBC QN AUTO: 27 PG (ref 26–34)
MCHC RBC AUTO-ENTMCNC: 31.9 G/DL (ref 32–36)
MCV RBC AUTO: 85 FL (ref 80–100)
MONOCYTES # BLD AUTO: 0.77 X10*3/UL (ref 0.1–1)
MONOCYTES NFR BLD AUTO: 9.7 %
NEUTROPHILS # BLD AUTO: 4.07 X10*3/UL (ref 1.2–7.7)
NEUTROPHILS NFR BLD AUTO: 51.5 %
NITRITE UR QL STRIP.AUTO: NEGATIVE
NRBC BLD-RTO: 0 /100 WBCS (ref 0–0)
PH UR STRIP.AUTO: 7 [PH]
PLATELET # BLD AUTO: 401 X10*3/UL (ref 150–450)
POTASSIUM SERPL-SCNC: 4.6 MMOL/L (ref 3.5–5.3)
PROT SERPL-MCNC: 6.8 G/DL (ref 6.4–8.2)
PROT UR STRIP.AUTO-MCNC: NORMAL MG/DL
RBC # BLD AUTO: 4.26 X10*6/UL (ref 4–5.2)
RBC # UR STRIP.AUTO: NEGATIVE /UL
RBC #/AREA URNS AUTO: NORMAL /HPF
SODIUM SERPL-SCNC: 137 MMOL/L (ref 136–145)
SP GR UR STRIP.AUTO: 1.02
SQUAMOUS #/AREA URNS AUTO: NORMAL /HPF
UROBILINOGEN UR STRIP.AUTO-MCNC: NORMAL MG/DL
WBC # BLD AUTO: 7.9 X10*3/UL (ref 4.4–11.3)
WBC #/AREA URNS AUTO: NORMAL /HPF

## 2025-01-25 PROCEDURE — 80053 COMPREHEN METABOLIC PANEL: CPT

## 2025-01-25 PROCEDURE — 85025 COMPLETE CBC W/AUTO DIFF WBC: CPT

## 2025-01-25 PROCEDURE — 83036 HEMOGLOBIN GLYCOSYLATED A1C: CPT

## 2025-01-25 PROCEDURE — 81001 URINALYSIS AUTO W/SCOPE: CPT

## 2025-01-27 NOTE — PROGRESS NOTES
Pulmonary Consult    Request of Pulmonary Consult by No ref. provider found, [unfilled] to evaluate [unfilled] for {AHGREASONSFORVISIT (Optional):55275}. I have independently interviewed and examined the patient in the office and reviewed available records.    Physician HPI (1/27/2025):    Immunization History:  Immunization History   Administered Date(s) Administered    DT (pediatric) 08/06/1997    Pfizer Purple Cap SARS-CoV-2 08/06/2021, 08/27/2021    Pneumococcal conjugate vaccine, 20-valent (PREVNAR 20) 12/20/2022, 01/07/2025    Pneumococcal polysaccharide vaccine, 23-valent, age 2 years and older (PNEUMOVAX 23) 09/25/2020       Family History:  Family History   Problem Relation Name Age of Onset    No Known Problems Mother      Diabetes Father      Diabetes Daughter         Social History:  Social History     Socioeconomic History    Marital status:    Tobacco Use    Smoking status: Never    Smokeless tobacco: Never       Current Medications:  Current Outpatient Medications   Medication Instructions    albuterol sulfate (Proair Digihaler) 90 mcg/actuation aero powdr breath act w/sensor inhaler 2 puffs, inhalation, Every 6 hours PRN    budesonide-formoteroL (Symbicort) 160-4.5 mcg/actuation inhaler 2 puffs, inhalation, 2 times daily    estradiol (Estrace) 0.01 % (0.1 mg/gram) vaginal cream Insert into the vagina.    fexofenadine-pseudoephedrine (Allegra-D)  mg 12 hr tablet Take by mouth.    ibandronate (BONIVA) 150 mg, oral, Every 30 days, Take in morning with full glass of water on an empty stomach. No food, drink, meds, or lying down for 60 minutes after.        Drug Allergies/Intolerances:  Allergies   Allergen Reactions    Bee Pollen Unknown    Corn Unknown    Penicillin Hives and Swelling    Prednisone Unknown    Wheat Unknown        Review of Systems:  Review of Systems     All other review of systems are negative and/or non-contributory.    Physical Examination:  There were no vitals  taken for this visit.     General: ambulated independently; no acute distress; well-nourished; work of breathing was not increased; normal vocal character  HEENT: normocephalic; anicteric sclerae; conjunctivae not injected; nasal mucosa was unremarkable; oropharynx was clear without evidence of thrush; dentition was good.  Neck: supple; no lymphadenopathy or thyromegaly.  Chest: clear to auscultation bilaterally; no chest wall deformity.  Cardiac: regular rhythm; no gallop or murmur.  Abdomen: soft; non-tender; non-distended; no hepatosplenomegaly.  Extremities: no leg edema; no digital clubbing; 2+ pulses  Psychiatric: did not appear depressed or anxious.    Pulmonary Function Test Results     No results found for this or any previous visit from the past 365 days.      Chest Radiograph     XR chest 2 views 11/30/2024    Narrative  STUDY:  Chest Radiographs;  11/30/2024 at 11:29 am  INDICATION:  Cough.  COMPARISON:  XR chest 12/6/2023, 9/8/2023 and 2/15/2023  ACCESSION NUMBER(S):  [id number]  ORDERING CLINICIAN:  CAROLYN OTOOLE  TECHNIQUE:  Frontal and lateral chest.  FINDINGS:  CARDIOMEDIASTINAL SILHOUETTE:  Cardiomediastinal silhouette is normal in size and configuration.    LUNGS:  There is an ill-defined opacity noted adjacent to the right heart  border within the right middle lobe most consistent with a  pneumonitis..    ABDOMEN:  No remarkable upper abdominal findings.    BONES:  No acute osseous changes.    Impression  Ill-defined opacity located within the right middle lobe most  consistent with a pneumonitis..  Signed by Geo Pierre MD      Echocardiogram     No results found for this or any previous visit from the past 365 days.       Chest CT Scan     CT chest wo IV contrast    1/22/2025  Status: Final result     PACS Images     Show images for CT chest wo IV contrast  Signed by    Signed Time Phone Pager   Kameron Rose MD 1/23/2025 17:27 [phone]     Exam Information    Status Exam Begun  Exam Ended   Final 1/22/2025 14:20 1/22/2025 14:40     Study Result    Narrative & Impression   Interpreted By:  Kameron Rose,   STUDY:  CT CHEST WO IV CONTRAST;  1/22/2025 2:40 pm      INDICATION:  Signs/Symptoms:right middle lobe consolidation.      ,J18.9 Pneumonia, unspecified organism,J98.11 Atelectasis      COMPARISON:  11/30/2024      ACCESSION NUMBER(S):  [id number]      ORDERING CLINICIAN:  JUNI MCQUEEN      TECHNIQUE:  Helical data acquisition of the chest was obtained without the use of  IV contrast. Images were reformatted in axial, coronal, and sagittal  planes.      FINDINGS:  POTENTIAL LIMITATIONS OF THE STUDY:   Lack of IV contrast      HEART AND VESSELS:      There are atherosclerotic calcifications of the aorta and its  branches. Ascending aorta is mildly ectatic measuring 3.7 cm in AP  dimension, but is unchanged when compared to the previous examination.      The heart is not significantly enlarged.      No pericardial effusion is seen.      MEDIASTINUM AND EVAN, LOWER NECK AND AXILLA:  The visualized thyroid gland is within normal limits.      No evidence of thoracic lymphadenopathy by CT criteria.      Esophagus appears within normal limits as seen.      LUNGS AND AIRWAYS:  The trachea and central airways are patent. No endobronchial lesion.  There are some areas of mucous plugging, most conspicuous in the  right middle lobe and base of the right upper lobe.      There is a persistent area of partial collapse/consolidation  involving the right middle lobe. This is slightly improved when  compared to the previous examination but has not resolved. Additional  consolidative and reticulonodular opacities are noted at the base of  the right upper lobe, where there is some mucous plugging, similar to  the previous examination. There are additional scattered areas of  scarring/atelectasis which are unchanged. No effusion. No  pneumothorax. Stable 3 mm nodule in the left lower lobe, image 167.  Few  additional tiny nodules are seen measuring 3 mm or less in size,  unchanged, for example, images 190 and 198. These are all unchanged  when compared to the previous study. No new pulmonary nodule or mass.      UPPER ABDOMEN:  The visualized subdiaphragmatic structures demonstrate no acute  abnormality.      CHEST WALL AND OSSEOUS STRUCTURES:  Degenerative changes. No acute process.      IMPRESSION:  Persistent area of partial collapse/consolidation involving the right  middle lobe which is improved but not resolved. Additional  consolidative and reticulonodular opacities at the base of the right  upper lobe, not significantly changed. Mucous plugging.  Stable nodules measuring 3 mm or less in size.      MACRO:  None.      Signed by: Kameron Rose 1/23/2025 5:27 PM  Dictation workstation:   [id number]          Co-morbidities Problem List    Assessment and Plan / Recommendations:  Problem List Items Addressed This Visit    None       REBECA DOSHI MA  01/28/2025

## 2025-01-28 ENCOUNTER — APPOINTMENT (OUTPATIENT)
Facility: CLINIC | Age: 70
End: 2025-01-28
Payer: MEDICARE

## 2025-01-28 ENCOUNTER — OFFICE VISIT (OUTPATIENT)
Dept: PRIMARY CARE | Facility: CLINIC | Age: 70
End: 2025-01-28
Payer: MEDICARE

## 2025-01-28 VITALS — SYSTOLIC BLOOD PRESSURE: 152 MMHG | DIASTOLIC BLOOD PRESSURE: 84 MMHG | TEMPERATURE: 97.8 F

## 2025-01-28 DIAGNOSIS — R68.89 FLU-LIKE SYMPTOMS: Primary | ICD-10-CM

## 2025-01-28 DIAGNOSIS — J01.90 ACUTE SINUSITIS, RECURRENCE NOT SPECIFIED, UNSPECIFIED LOCATION: ICD-10-CM

## 2025-01-28 DIAGNOSIS — J98.11 LOBULAR ATELECTASIS: ICD-10-CM

## 2025-01-28 LAB
POC RAPID INFLUENZA A: NEGATIVE
POC RAPID INFLUENZA B: NEGATIVE
POC SARS-COV-2 AG BINAX: NORMAL

## 2025-01-28 PROCEDURE — 1036F TOBACCO NON-USER: CPT | Performed by: FAMILY MEDICINE

## 2025-01-28 PROCEDURE — 99214 OFFICE O/P EST MOD 30 MIN: CPT | Performed by: FAMILY MEDICINE

## 2025-01-28 PROCEDURE — 1160F RVW MEDS BY RX/DR IN RCRD: CPT | Performed by: FAMILY MEDICINE

## 2025-01-28 PROCEDURE — 87804 INFLUENZA ASSAY W/OPTIC: CPT | Performed by: FAMILY MEDICINE

## 2025-01-28 PROCEDURE — 1159F MED LIST DOCD IN RCRD: CPT | Performed by: FAMILY MEDICINE

## 2025-01-28 PROCEDURE — 1123F ACP DISCUSS/DSCN MKR DOCD: CPT | Performed by: FAMILY MEDICINE

## 2025-01-28 PROCEDURE — 87811 SARS-COV-2 COVID19 W/OPTIC: CPT | Performed by: FAMILY MEDICINE

## 2025-01-28 RX ORDER — AZITHROMYCIN 250 MG/1
TABLET, FILM COATED ORAL
Qty: 6 TABLET | Refills: 0 | Status: SHIPPED | OUTPATIENT
Start: 2025-01-28 | End: 2025-02-02

## 2025-01-28 NOTE — PATIENT INSTRUCTIONS
I will order antibiotics.  Return in one week if this persists.      We discussed your more recent CT scan. This shows improvement in the right middle lobe collapse, which is good.  You should use incentive spirometry for this problem and use Mucinex.  There may be a mucous plug blocking the right middle lobe of the lung.

## 2025-01-28 NOTE — PROGRESS NOTES
Subjective   Patient ID: [id number]     Nicolle Campbell is a 69 y.o. female who presents for Facial Pain (Sinus pain/pressure), Sore Throat, Earache (Bilateral), and Headache.  HPI  She complains of facial pain, sinus pressure, sore throat, ear ache and headache.      She had a flu and covid test ran here today.  Both were negative.     She just had a recheck CT of the chest.  This was discussed with her. Improvement but still persistent.  Incentive spirometry and mucinex.  Never a smoker.    No abdominal pain or diarrhea.     Objective     /84 (BP Location: Right arm, Patient Position: Sitting)   Temp 36.6 °C (97.8 °F) (Skin)      Physical Exam  Constitutional:       Appearance: Normal appearance.   HENT:      Nose: Congestion present.      Comments: Sinus tenderness.     Mouth/Throat:      Pharynx: No oropharyngeal exudate or posterior oropharyngeal erythema.   Cardiovascular:      Rate and Rhythm: Normal rate and regular rhythm.      Heart sounds: Normal heart sounds. No murmur heard.  Pulmonary:      Effort: Pulmonary effort is normal. No respiratory distress.      Breath sounds: Normal breath sounds.   Neurological:      General: No focal deficit present.      Mental Status: She is alert and oriented to person, place, and time.         Assessment/Plan   Problem List Items Addressed This Visit    None  Visit Diagnoses       Flu-like symptoms    -  Primary    Relevant Orders    POCT BinaxNOW Covid-19 Ag Card manually resulted    POCT Influenza A/B manually resulted          I will order antibiotics.  Return in one week if this persists.      We discussed your more recent CT scan. This shows improvement in the right middle lobe collapse, which is good.  You should use incentive spirometry for this problem and use Mucinex.  There may be a mucous plug blocking the right middle lobe of the lung.   Jacoby Conklin, DO

## 2025-02-05 NOTE — PROGRESS NOTES
Pulmonary clinic follow-up:  Follow-up  for asthma and abnormal CT chest  I have independently interviewed and examined the patient in the office and reviewed available records.    Physician HPI (2/17/2025):    A 69-year-old lady with past medical history of asthma since childhood she is maintained on Allegra and Symbicort and albuterol as needed.  She was initially evaluated in December 2020 for for abnormal CT. that showed middle lobe consolidation collapse  Since her previous visit, she was prescribed Levaquin for 10 days that improved cough and expectoration symptoms but symptoms has relapsed after stopping the medication in the form of cough and expectoration of colored phlegm.  Also she has asthma symptoms that is not well-controlled although she is maintained on albuterol and Symbicort she has more than 2 times per week night symptoms in the form of chest wheezes and cough  She also complains of exertional shortness of breath  She had a recent CT chest 3 months follow-up as compared to the previous images shows improvement of the middle lobe consolidation with areas of irrigation but no complete resolution also reported multiple pulmonary nodules  She denies any fever hemoptysis or chest pain  PFT was ordered but not done    She has been penicillin allergy      Immunization History:  Immunization History   Administered Date(s) Administered    DT (pediatric) 08/06/1997    Pfizer Purple Cap SARS-CoV-2 08/06/2021, 08/27/2021    Pneumococcal conjugate vaccine, 20-valent (PREVNAR 20) 12/20/2022, 01/07/2025    Pneumococcal polysaccharide vaccine, 23-valent, age 2 years and older (PNEUMOVAX 23) 09/25/2020       Family History:  Family History   Problem Relation Name Age of Onset    No Known Problems Mother      Diabetes Father      Diabetes Daughter         Social History:  Social History     Socioeconomic History    Marital status:    Tobacco Use    Smoking status: Never    Smokeless tobacco: Never  "      Current Medications:  Current Outpatient Medications   Medication Instructions    albuterol sulfate (Proair Digihaler) 90 mcg/actuation aero powdr breath act w/sensor inhaler 2 puffs, inhalation, Every 6 hours PRN    budesonide-formoteroL (Symbicort) 160-4.5 mcg/actuation inhaler 2 puffs, inhalation, 2 times daily    doxycycline (VIBRAMYCIN) 100 mg, oral, 2 times daily, Take with at least 8 ounces (large glass) of water, do not lie down for 30 minutes after    estradiol (Estrace) 0.01 % (0.1 mg/gram) vaginal cream Insert into the vagina.    fexofenadine-pseudoephedrine (Allegra-D)  mg 12 hr tablet Take by mouth.    ibandronate (BONIVA) 150 mg, oral, Every 30 days, Take in morning with full glass of water on an empty stomach. No food, drink, meds, or lying down for 60 minutes after.    ipratropium-albuteroL (Duo-Neb) 0.5-2.5 mg/3 mL nebulizer solution Use 3 times per day    montelukast (SINGULAIR) 10 mg, oral, Nightly    mucus clearing device device Use 5 times per day  acapella    Muscenx  Symbicort  Albuterol      Drug Allergies/Intolerances:  Allergies   Allergen Reactions    Bee Pollen Unknown    Corn Unknown    Penicillin Hives and Swelling    Prednisone Unknown    Wheat Unknown        Review of Systems:    All other review of systems are negative and/or non-contributory.    Physical Examination:  /79   Pulse 93   Temp 36.3 °C (97.4 °F)   Resp 18   Ht 1.676 m (5' 6\")   Wt 49.9 kg (110 lb) Comment: patient refused  to be weighed, told me this weight  SpO2 98%   BMI 17.75 kg/m²      General: ambulated independently; no acute distress; underweight   HEENT: normocephalic; anicteric sclerae; conjunctivae not injected;   Neck: supple; no lymphadenopathy or thyromegaly.  Chest: Rhonchi bilateral   cardiac: regular rhythm; no gallop or murmur.  Abdomen: soft; non-tender; non-distended; no hepatosplenomegaly.  Extremities: Mild bilateral lower limb edema   psychiatric: did not appear depressed or " anxious.    Pulmonary Function Test Results     No results found for this or any previous visit from the past 365 days.      Chest Radiograph     XR chest 2 views 11/30/2024    Narrative  STUDY:  Chest Radiographs;  11/30/2024 at 11:29 am  INDICATION:  Cough.  COMPARISON:  XR chest 12/6/2023, 9/8/2023 and 2/15/2023  ACCESSION NUMBER(S):  [id number]  ORDERING CLINICIAN:  CAROLYN OTOOLE  TECHNIQUE:  Frontal and lateral chest.  FINDINGS:  CARDIOMEDIASTINAL SILHOUETTE:  Cardiomediastinal silhouette is normal in size and configuration.    LUNGS:  There is an ill-defined opacity noted adjacent to the right heart  border within the right middle lobe most consistent with a  pneumonitis..    ABDOMEN:  No remarkable upper abdominal findings.    BONES:  No acute osseous changes.    Impression  Ill-defined opacity located within the right middle lobe most  consistent with a pneumonitis..  Signed by Geo Pierre MD      Echocardiogram     No results found for this or any previous visit from the past 365 days.       Chest CT Scan     CT chest wo IV contrast    1/22/2025  Status: Final result     PACS Images     Show images for CT chest wo IV contrast  Signed by    Signed Time Phone Pager   Kameron Rose MD 1/23/2025 17:27 [phone]     Exam Information    Status Exam Begun Exam Ended   Final 1/22/2025 14:20 1/22/2025 14:40     Study Result    Narrative & Impression   Interpreted By:  Kameron Rose,   STUDY:  CT CHEST WO IV CONTRAST;  1/22/2025 2:40 pm      INDICATION:  Signs/Symptoms:right middle lobe consolidation.      ,J18.9 Pneumonia, unspecified organism,J98.11 Atelectasis      COMPARISON:  11/30/2024      ACCESSION NUMBER(S):  [id number]      ORDERING CLINICIAN:  JUNI MCQUEEN      TECHNIQUE:  Helical data acquisition of the chest was obtained without the use of  IV contrast. Images were reformatted in axial, coronal, and sagittal  planes.      FINDINGS:  POTENTIAL LIMITATIONS OF THE STUDY:   Lack of IV  contrast      HEART AND VESSELS:      There are atherosclerotic calcifications of the aorta and its  branches. Ascending aorta is mildly ectatic measuring 3.7 cm in AP  dimension, but is unchanged when compared to the previous examination.      The heart is not significantly enlarged.      No pericardial effusion is seen.      MEDIASTINUM AND EVAN, LOWER NECK AND AXILLA:  The visualized thyroid gland is within normal limits.      No evidence of thoracic lymphadenopathy by CT criteria.      Esophagus appears within normal limits as seen.      LUNGS AND AIRWAYS:  The trachea and central airways are patent. No endobronchial lesion.  There are some areas of mucous plugging, most conspicuous in the  right middle lobe and base of the right upper lobe.      There is a persistent area of partial collapse/consolidation  involving the right middle lobe. This is slightly improved when  compared to the previous examination but has not resolved. Additional  consolidative and reticulonodular opacities are noted at the base of  the right upper lobe, where there is some mucous plugging, similar to  the previous examination. There are additional scattered areas of  scarring/atelectasis which are unchanged. No effusion. No  pneumothorax. Stable 3 mm nodule in the left lower lobe, image 167.  Few additional tiny nodules are seen measuring 3 mm or less in size,  unchanged, for example, images 190 and 198. These are all unchanged  when compared to the previous study. No new pulmonary nodule or mass.      UPPER ABDOMEN:  The visualized subdiaphragmatic structures demonstrate no acute  abnormality.      CHEST WALL AND OSSEOUS STRUCTURES:  Degenerative changes. No acute process.      IMPRESSION:  Persistent area of partial collapse/consolidation involving the right  middle lobe which is improved but not resolved. Additional  consolidative and reticulonodular opacities at the base of the right  upper lobe, not significantly changed. Mucous  plugging.  Stable nodules measuring 3 mm or less in size.      MACRO:  None.      Signed by: Kameron Rose 1/23/2025 5:27 PM  Dictation workstation:   [id number]                Assessment and Plan / Recommendations:  Problem List Items Addressed This Visit       Asthma, moderate persistent (HHS-HCC)    Relevant Medications    montelukast (Singulair) 10 mg tablet    doxycycline (Vibramycin) 100 mg capsule    ipratropium-albuteroL (Duo-Neb) 0.5-2.5 mg/3 mL nebulizer solution    mucus clearing device device    Other Relevant Orders    Aspergillus Antibodies    Respiratory Allergy Profile IgE    Respiratory Culture/Smear    Complete Pulmonary Function Test Pre/Post Bronchodilator (Spirometry Pre/Post/DLCO/Lung Volumes)    Immunoglobulins (IgG, IgA, IgM)     Other Visit Diagnoses       Middle lobe consolidation (CMS-HCC)    -  Primary    Relevant Medications    montelukast (Singulair) 10 mg tablet    doxycycline (Vibramycin) 100 mg capsule    ipratropium-albuteroL (Duo-Neb) 0.5-2.5 mg/3 mL nebulizer solution    mucus clearing device device    Other Relevant Orders    Aspergillus Antibodies    Respiratory Allergy Profile IgE    Respiratory Culture/Smear    Complete Pulmonary Function Test Pre/Post Bronchodilator (Spirometry Pre/Post/DLCO/Lung Volumes)    Immunoglobulins (IgG, IgA, IgM)    Bronchiectasis with acute exacerbation (Multi)            Assessment and plan   Uncontrolled asthma:  With recent exacerbation  Persistent asthma symptoms  Recommend:  PFT with and without bronchodilator  Step up on medication  Singulair was prescribed  DuoNeb were ordered  Continue with Symbicort and albuterol inhaler  Possibility of allergic bronchopulmonary aspergillosis  Respiratory allergy test   Aspergillus antibodies    Right middle lobe consolidation  Middle lobe bronchiectasis  Bronchiectasis exacerbation  Prescribed doxycycline for 10 days  Postural drainage mucus clearing device with Acapella  Check sputum culture and  sensitivity  Counseled patient on how to use mucus clearing device  Check immunoglobulin level IgG: Possibility of immunoglobulin deficiency  Consideration of weekly IgG transfusion          Aaron Farias MD  02/17/2025

## 2025-02-17 ENCOUNTER — APPOINTMENT (OUTPATIENT)
Facility: CLINIC | Age: 70
End: 2025-02-17
Payer: MEDICARE

## 2025-02-17 VITALS
RESPIRATION RATE: 18 BRPM | HEIGHT: 66 IN | HEART RATE: 93 BPM | DIASTOLIC BLOOD PRESSURE: 79 MMHG | BODY MASS INDEX: 17.68 KG/M2 | WEIGHT: 110 LBS | TEMPERATURE: 97.4 F | SYSTOLIC BLOOD PRESSURE: 131 MMHG | OXYGEN SATURATION: 98 %

## 2025-02-17 DIAGNOSIS — J45.41 MODERATE PERSISTENT ASTHMA WITH ACUTE EXACERBATION (HHS-HCC): ICD-10-CM

## 2025-02-17 DIAGNOSIS — J47.1 BRONCHIECTASIS WITH ACUTE EXACERBATION (MULTI): ICD-10-CM

## 2025-02-17 DIAGNOSIS — J18.1: Primary | ICD-10-CM

## 2025-02-17 PROCEDURE — 99215 OFFICE O/P EST HI 40 MIN: CPT | Performed by: INTERNAL MEDICINE

## 2025-02-17 PROCEDURE — 1159F MED LIST DOCD IN RCRD: CPT | Performed by: INTERNAL MEDICINE

## 2025-02-17 PROCEDURE — 1123F ACP DISCUSS/DSCN MKR DOCD: CPT | Performed by: INTERNAL MEDICINE

## 2025-02-17 PROCEDURE — 1036F TOBACCO NON-USER: CPT | Performed by: INTERNAL MEDICINE

## 2025-02-17 PROCEDURE — G2211 COMPLEX E/M VISIT ADD ON: HCPCS | Performed by: INTERNAL MEDICINE

## 2025-02-17 PROCEDURE — 3008F BODY MASS INDEX DOCD: CPT | Performed by: INTERNAL MEDICINE

## 2025-02-17 RX ORDER — IPRATROPIUM BROMIDE AND ALBUTEROL SULFATE 2.5; .5 MG/3ML; MG/3ML
SOLUTION RESPIRATORY (INHALATION)
Qty: 5 ML | Refills: 3 | Status: SHIPPED | OUTPATIENT
Start: 2025-02-17

## 2025-02-17 RX ORDER — ALBUTEROL SULFATE 0.83 MG/ML
3 SOLUTION RESPIRATORY (INHALATION) ONCE
OUTPATIENT
Start: 2025-02-17 | End: 2025-02-17

## 2025-02-17 RX ORDER — DOXYCYCLINE 100 MG/1
100 CAPSULE ORAL 2 TIMES DAILY
Qty: 20 CAPSULE | Refills: 0 | Status: SHIPPED | OUTPATIENT
Start: 2025-02-17 | End: 2025-02-27

## 2025-02-17 RX ORDER — MONTELUKAST SODIUM 10 MG/1
10 TABLET ORAL NIGHTLY
Qty: 30 TABLET | Refills: 11 | Status: SHIPPED | OUTPATIENT
Start: 2025-02-17 | End: 2026-02-17

## 2025-02-17 RX ORDER — ALBUTEROL SULFATE 90 UG/1
1 INHALANT RESPIRATORY (INHALATION) ONCE
OUTPATIENT
Start: 2025-02-17

## 2025-02-17 ASSESSMENT — ASTHMA QUESTIONNAIRES
QUESTION_2 LAST FOUR WEEKS HOW OFTEN HAVE YOU HAD SHORTNESS OF BREATH: MORE THAN ONCE A DAY
ACT_TOTALSCORE: 11
QUESTION_1 LAST FOUR WEEKS HOW MUCH OF THE TIME DID YOUR ASTHMA KEEP YOU FROM GETTING AS MUCH DONE AT WORK, SCHOOL OR AT HOME: NONE OF THE TIME
QUESTION_4 LAST FOUR WEEKS HOW OFTEN HAVE YOU USED YOUR RESCUE INHALER OR NEBULIZER MEDICATION (SUCH AS ALBUTEROL): 1 OR TWO TIMES PER DAY
QUESTION_5 LAST FOUR WEEKS HOW WOULD YOU RATE YOUR ASTHMA CONTROL: POORLY CONTROLLED

## 2025-02-17 ASSESSMENT — PATIENT HEALTH QUESTIONNAIRE - PHQ9
2. FEELING DOWN, DEPRESSED OR HOPELESS: NOT AT ALL
1. LITTLE INTEREST OR PLEASURE IN DOING THINGS: NOT AT ALL
SUM OF ALL RESPONSES TO PHQ9 QUESTIONS 1 AND 2: 0

## 2025-02-20 DIAGNOSIS — J18.1: ICD-10-CM

## 2025-02-20 DIAGNOSIS — J45.41 MODERATE PERSISTENT ASTHMA WITH ACUTE EXACERBATION (HHS-HCC): ICD-10-CM

## 2025-02-20 DIAGNOSIS — J47.1 BRONCHIECTASIS WITH ACUTE EXACERBATION (MULTI): Primary | ICD-10-CM

## 2025-02-20 RX ORDER — LEVOFLOXACIN 500 MG/1
500 TABLET, FILM COATED ORAL EVERY 24 HOURS
Status: CANCELLED | OUTPATIENT
Start: 2025-02-20

## 2025-02-20 NOTE — TELEPHONE ENCOUNTER
Patient is unable to take doxycycline. Causing severe nausea and vomiting. Can you change her antibiotic to something else such as e-mycin, or cipro? Can not take Levaquin.

## 2025-02-21 LAB
A ALTERNATA IGE QN: 3.07 KU/L
A ALTERNATA IGE RAST: 2
A FLAVUS AB SER QL ID: NEGATIVE
A FUMIGATUS AB SER QL ID: NEGATIVE
A FUMIGATUS IGE QN: 1.98 KU/L
A FUMIGATUS IGE RAST: 2
A NIGER AB SER QL ID: NEGATIVE
ASPERGILLUS AB TITR SER CF: ABNORMAL TITER
BERMUDA GRASS IGE QN: <0.1 KU/L
BERMUDA GRASS IGE RAST: 0
BOXELDER IGE QN: <0.1 KU/L
BOXELDER IGE RAST: 0
C HERBARUM IGE QN: 0.15 KU/L
C HERBARUM IGE RAST: ABNORMAL
CALIF WALNUT POLN IGE QN: <0.1 KU/L
CALIF WALNUT POLN IGE RAST: 0
CAT (RFEL D) 1 IGE QN: 0.49 KU/L
CAT (RFEL D) 4 IGE QN: <0.1 KU/L
CAT (RFEL D) 7 IGE QN: <0.1 KU/L
CAT DANDER IGE QN: 0.5 KU/L
CAT DANDER IGE RAST: 1
CMN PIGWEED IGE QN: 0.11 KU/L
CMN PIGWEED IGE RAST: ABNORMAL
COMMON RAGWEED IGE QN: 0.6 KU/L
COMMON RAGWEED IGE RAST: 1
COTTONWOOD IGE QN: <0.1 KU/L
COTTONWOOD IGE RAST: 0
D FARINAE IGE QN: 0.41 KU/L
D FARINAE IGE RAST: 1
D PTERONYSS IGE QN: 0.4 KU/L
D PTERONYSS IGE RAST: 1
DOG (RCAN F) 1 IGE QN: <0.1 KU/L
DOG (RCAN F) 2 IGE QN: 0.19 KU/L
DOG (RCAN F) 4 IGE QN: <0.1 KU/L
DOG (RCAN F) 5 IGE QN: <0.1 KU/L
DOG (RCAN F) 6 IGE QN: <0.1 KU/L
DOG DANDER IGE QN: 0.21 KU/L
DOG DANDER IGE RAST: ABNORMAL
IGE SERPL-ACNC: 133 KU/L
LONDON PLANE IGE QN: 0.11 KU/L
LONDON PLANE IGE RAST: ABNORMAL
MOUSE URINE PROT IGE QN: <0.1 KU/L
MOUSE URINE PROT IGE RAST: 0
MT JUNIPER IGE QN: <0.1 KU/L
MT JUNIPER IGE RAST: 0
P NOTATUM IGE QN: 0.21 KU/L
P NOTATUM IGE RAST: ABNORMAL
PECAN/HICK TREE IGE QN: <0.1 KU/L
PECAN/HICK TREE IGE RAST: 0
QUEST CAT DANDER COMP PNL FEL D 2 (E220) IGE: <0.1 KU/L
QUEST DOG DANDER COMP PNL CAN F 3 (E221) IGE: <0.1 KU/L
REF LAB TEST REF RANGE: NORMAL
ROACH IGE QN: <0.1 KU/L
ROACH IGE RAST: 0
SALTWORT IGE QN: <0.1 KU/L
SALTWORT IGE RAST: 0
SHEEP SORREL IGE QN: <0.1 KU/L
SHEEP SORREL IGE RAST: 0
SILVER BIRCH IGE QN: <0.1 KU/L
SILVER BIRCH IGE RAST: 0
TIMOTHY IGE QN: 0.62 KU/L
TIMOTHY IGE RAST: 1
WHITE ASH IGE QN: <0.1 KU/L
WHITE ASH IGE RAST: 0
WHITE ELM IGE QN: <0.1 KU/L
WHITE ELM IGE RAST: 0
WHITE MULBERRY IGE QN: <0.1 KU/L
WHITE MULBERRY IGE RAST: 0
WHITE OAK IGE QN: <0.1 KU/L
WHITE OAK IGE RAST: 0

## 2025-02-21 RX ORDER — CIPROFLOXACIN 250 MG/1
250 TABLET, FILM COATED ORAL 2 TIMES DAILY
Qty: 20 TABLET | Refills: 0 | Status: SHIPPED | OUTPATIENT
Start: 2025-02-21 | End: 2025-03-03

## 2025-02-24 RX ORDER — IPRATROPIUM BROMIDE AND ALBUTEROL SULFATE 2.5; .5 MG/3ML; MG/3ML
SOLUTION RESPIRATORY (INHALATION)
Qty: 180 ML | Refills: 3 | Status: SHIPPED | OUTPATIENT
Start: 2025-02-24

## 2025-02-28 DIAGNOSIS — J47.1 BRONCHIECTASIS WITH ACUTE EXACERBATION (MULTI): ICD-10-CM

## 2025-02-28 RX ORDER — CIPROFLOXACIN 250 MG/1
250 TABLET, FILM COATED ORAL 2 TIMES DAILY
Qty: 20 TABLET | Refills: 0 | Status: SHIPPED | OUTPATIENT
Start: 2025-02-28 | End: 2025-03-10

## 2025-03-16 ENCOUNTER — HOSPITAL ENCOUNTER (INPATIENT)
Facility: HOSPITAL | Age: 70
DRG: 191 | End: 2025-03-16
Attending: EMERGENCY MEDICINE | Admitting: INTERNAL MEDICINE
Payer: MEDICARE

## 2025-03-16 ENCOUNTER — APPOINTMENT (OUTPATIENT)
Dept: RADIOLOGY | Facility: HOSPITAL | Age: 70
DRG: 191 | End: 2025-03-16
Payer: MEDICARE

## 2025-03-16 ENCOUNTER — HOSPITAL ENCOUNTER (INPATIENT)
Dept: CARDIOLOGY | Facility: HOSPITAL | Age: 70
Discharge: HOME | DRG: 191 | End: 2025-03-16
Payer: MEDICARE

## 2025-03-16 ENCOUNTER — APPOINTMENT (OUTPATIENT)
Dept: CARDIOLOGY | Facility: HOSPITAL | Age: 70
DRG: 191 | End: 2025-03-16
Payer: MEDICARE

## 2025-03-16 DIAGNOSIS — Z78.9 FAILURE OF OUTPATIENT TREATMENT: ICD-10-CM

## 2025-03-16 DIAGNOSIS — R07.9 CHEST PAIN, UNSPECIFIED TYPE: ICD-10-CM

## 2025-03-16 DIAGNOSIS — I24.9 ACS (ACUTE CORONARY SYNDROME) (MULTI): ICD-10-CM

## 2025-03-16 DIAGNOSIS — R06.02 SHORTNESS OF BREATH: ICD-10-CM

## 2025-03-16 DIAGNOSIS — J98.19 RIGHT MIDDLE LOBE SYNDROME: ICD-10-CM

## 2025-03-16 DIAGNOSIS — J98.11 COLLAPSE OF RIGHT LUNG: Primary | ICD-10-CM

## 2025-03-16 DIAGNOSIS — J18.9 PNEUMONIA DUE TO INFECTIOUS ORGANISM, UNSPECIFIED LATERALITY, UNSPECIFIED PART OF LUNG: ICD-10-CM

## 2025-03-16 DIAGNOSIS — J18.9 PNEUMONIA OF RIGHT MIDDLE LOBE DUE TO INFECTIOUS ORGANISM: ICD-10-CM

## 2025-03-16 PROBLEM — R06.00 DYSPNEA: Status: ACTIVE | Noted: 2025-03-16

## 2025-03-16 LAB
ALBUMIN SERPL BCP-MCNC: 4.1 G/DL (ref 3.4–5)
ALP SERPL-CCNC: 104 U/L (ref 33–136)
ALT SERPL W P-5'-P-CCNC: 20 U/L (ref 7–45)
ANION GAP SERPL CALC-SCNC: 11 MMOL/L (ref 10–20)
AST SERPL W P-5'-P-CCNC: 24 U/L (ref 9–39)
BASOPHILS # BLD AUTO: 0.04 X10*3/UL (ref 0–0.1)
BASOPHILS NFR BLD AUTO: 0.5 %
BILIRUB SERPL-MCNC: 0.4 MG/DL (ref 0–1.2)
BNP SERPL-MCNC: 36 PG/ML (ref 0–99)
BUN SERPL-MCNC: 13 MG/DL (ref 6–23)
CALCIUM SERPL-MCNC: 9.2 MG/DL (ref 8.6–10.3)
CARDIAC TROPONIN I PNL SERPL HS: 7 NG/L (ref 0–13)
CARDIAC TROPONIN I PNL SERPL HS: 7 NG/L (ref 0–13)
CHLORIDE SERPL-SCNC: 102 MMOL/L (ref 98–107)
CO2 SERPL-SCNC: 27 MMOL/L (ref 21–32)
CREAT SERPL-MCNC: 0.82 MG/DL (ref 0.5–1.05)
D DIMER PPP FEU-MCNC: 720 NG/ML FEU
EGFRCR SERPLBLD CKD-EPI 2021: 77 ML/MIN/1.73M*2
EOSINOPHIL # BLD AUTO: 1.23 X10*3/UL (ref 0–0.7)
EOSINOPHIL NFR BLD AUTO: 14.3 %
ERYTHROCYTE [DISTWIDTH] IN BLOOD BY AUTOMATED COUNT: 13.8 % (ref 11.5–14.5)
FLUAV RNA RESP QL NAA+PROBE: NOT DETECTED
FLUBV RNA RESP QL NAA+PROBE: NOT DETECTED
GLUCOSE SERPL-MCNC: 110 MG/DL (ref 74–99)
HCT VFR BLD AUTO: 38.2 % (ref 36–46)
HGB BLD-MCNC: 12.3 G/DL (ref 12–16)
IMM GRANULOCYTES # BLD AUTO: 0.01 X10*3/UL (ref 0–0.7)
IMM GRANULOCYTES NFR BLD AUTO: 0.1 % (ref 0–0.9)
LYMPHOCYTES # BLD AUTO: 1.56 X10*3/UL (ref 1.2–4.8)
LYMPHOCYTES NFR BLD AUTO: 18.1 %
MCH RBC QN AUTO: 27 PG (ref 26–34)
MCHC RBC AUTO-ENTMCNC: 32.2 G/DL (ref 32–36)
MCV RBC AUTO: 84 FL (ref 80–100)
MONOCYTES # BLD AUTO: 0.56 X10*3/UL (ref 0.1–1)
MONOCYTES NFR BLD AUTO: 6.5 %
MRSA DNA SPEC QL NAA+PROBE: NOT DETECTED
NEUTROPHILS # BLD AUTO: 5.2 X10*3/UL (ref 1.2–7.7)
NEUTROPHILS NFR BLD AUTO: 60.5 %
NRBC BLD-RTO: 0 /100 WBCS (ref 0–0)
PLATELET # BLD AUTO: 452 X10*3/UL (ref 150–450)
POTASSIUM SERPL-SCNC: 4.9 MMOL/L (ref 3.5–5.3)
PROT SERPL-MCNC: 7.5 G/DL (ref 6.4–8.2)
RBC # BLD AUTO: 4.55 X10*6/UL (ref 4–5.2)
RSV RNA RESP QL NAA+PROBE: NOT DETECTED
SARS-COV-2 RNA RESP QL NAA+PROBE: NOT DETECTED
SODIUM SERPL-SCNC: 135 MMOL/L (ref 136–145)
WBC # BLD AUTO: 8.6 X10*3/UL (ref 4.4–11.3)

## 2025-03-16 PROCEDURE — 2500000001 HC RX 250 WO HCPCS SELF ADMINISTERED DRUGS (ALT 637 FOR MEDICARE OP)

## 2025-03-16 PROCEDURE — 99222 1ST HOSP IP/OBS MODERATE 55: CPT | Performed by: NURSE PRACTITIONER

## 2025-03-16 PROCEDURE — 2500000002 HC RX 250 W HCPCS SELF ADMINISTERED DRUGS (ALT 637 FOR MEDICARE OP, ALT 636 FOR OP/ED): Performed by: NURSE PRACTITIONER

## 2025-03-16 PROCEDURE — 84484 ASSAY OF TROPONIN QUANT: CPT | Performed by: EMERGENCY MEDICINE

## 2025-03-16 PROCEDURE — 85379 FIBRIN DEGRADATION QUANT: CPT | Performed by: EMERGENCY MEDICINE

## 2025-03-16 PROCEDURE — 71275 CT ANGIOGRAPHY CHEST: CPT

## 2025-03-16 PROCEDURE — 96366 THER/PROPH/DIAG IV INF ADDON: CPT

## 2025-03-16 PROCEDURE — 87641 MR-STAPH DNA AMP PROBE: CPT | Performed by: EMERGENCY MEDICINE

## 2025-03-16 PROCEDURE — 36415 COLL VENOUS BLD VENIPUNCTURE: CPT | Performed by: EMERGENCY MEDICINE

## 2025-03-16 PROCEDURE — 87634 RSV DNA/RNA AMP PROBE: CPT | Performed by: EMERGENCY MEDICINE

## 2025-03-16 PROCEDURE — 96365 THER/PROPH/DIAG IV INF INIT: CPT

## 2025-03-16 PROCEDURE — 80053 COMPREHEN METABOLIC PANEL: CPT | Performed by: EMERGENCY MEDICINE

## 2025-03-16 PROCEDURE — 1200000002 HC GENERAL ROOM WITH TELEMETRY DAILY

## 2025-03-16 PROCEDURE — 2550000001 HC RX 255 CONTRASTS: Performed by: EMERGENCY MEDICINE

## 2025-03-16 PROCEDURE — 94640 AIRWAY INHALATION TREATMENT: CPT

## 2025-03-16 PROCEDURE — 71250 CT THORAX DX C-: CPT | Mod: FOREIGN READ | Performed by: RADIOLOGY

## 2025-03-16 PROCEDURE — 2500000004 HC RX 250 GENERAL PHARMACY W/ HCPCS (ALT 636 FOR OP/ED): Performed by: NURSE PRACTITIONER

## 2025-03-16 PROCEDURE — 2500000004 HC RX 250 GENERAL PHARMACY W/ HCPCS (ALT 636 FOR OP/ED): Performed by: EMERGENCY MEDICINE

## 2025-03-16 PROCEDURE — 93005 ELECTROCARDIOGRAM TRACING: CPT

## 2025-03-16 PROCEDURE — 71250 CT THORAX DX C-: CPT

## 2025-03-16 PROCEDURE — 99285 EMERGENCY DEPT VISIT HI MDM: CPT | Mod: 25 | Performed by: EMERGENCY MEDICINE

## 2025-03-16 PROCEDURE — 71045 X-RAY EXAM CHEST 1 VIEW: CPT

## 2025-03-16 PROCEDURE — 85025 COMPLETE CBC W/AUTO DIFF WBC: CPT | Performed by: EMERGENCY MEDICINE

## 2025-03-16 PROCEDURE — 2500000001 HC RX 250 WO HCPCS SELF ADMINISTERED DRUGS (ALT 637 FOR MEDICARE OP): Performed by: NURSE PRACTITIONER

## 2025-03-16 PROCEDURE — 96375 TX/PRO/DX INJ NEW DRUG ADDON: CPT

## 2025-03-16 PROCEDURE — 71045 X-RAY EXAM CHEST 1 VIEW: CPT | Mod: FOREIGN READ | Performed by: RADIOLOGY

## 2025-03-16 PROCEDURE — 71275 CT ANGIOGRAPHY CHEST: CPT | Mod: FOREIGN READ | Performed by: RADIOLOGY

## 2025-03-16 PROCEDURE — 83880 ASSAY OF NATRIURETIC PEPTIDE: CPT | Performed by: EMERGENCY MEDICINE

## 2025-03-16 PROCEDURE — 2500000002 HC RX 250 W HCPCS SELF ADMINISTERED DRUGS (ALT 637 FOR MEDICARE OP, ALT 636 FOR OP/ED): Performed by: EMERGENCY MEDICINE

## 2025-03-16 RX ORDER — DIPHENHYDRAMINE HYDROCHLORIDE 50 MG/ML
50 INJECTION, SOLUTION INTRAMUSCULAR; INTRAVENOUS ONCE
Status: COMPLETED | OUTPATIENT
Start: 2025-03-16 | End: 2025-03-16

## 2025-03-16 RX ORDER — BENZONATATE 100 MG/1
100 CAPSULE ORAL 3 TIMES DAILY PRN
Status: DISCONTINUED | OUTPATIENT
Start: 2025-03-16 | End: 2025-03-20 | Stop reason: HOSPADM

## 2025-03-16 RX ORDER — MONTELUKAST SODIUM 10 MG/1
10 TABLET ORAL NIGHTLY
Status: DISCONTINUED | OUTPATIENT
Start: 2025-03-16 | End: 2025-03-20 | Stop reason: HOSPADM

## 2025-03-16 RX ORDER — VANCOMYCIN HYDROCHLORIDE 1 G/200ML
1000 INJECTION, SOLUTION INTRAVENOUS ONCE
Status: COMPLETED | OUTPATIENT
Start: 2025-03-16 | End: 2025-03-16

## 2025-03-16 RX ORDER — IPRATROPIUM BROMIDE AND ALBUTEROL SULFATE 2.5; .5 MG/3ML; MG/3ML
3 SOLUTION RESPIRATORY (INHALATION)
Status: DISCONTINUED | OUTPATIENT
Start: 2025-03-16 | End: 2025-03-16

## 2025-03-16 RX ORDER — IPRATROPIUM BROMIDE AND ALBUTEROL SULFATE 2.5; .5 MG/3ML; MG/3ML
3 SOLUTION RESPIRATORY (INHALATION)
Status: DISCONTINUED | OUTPATIENT
Start: 2025-03-17 | End: 2025-03-20 | Stop reason: HOSPADM

## 2025-03-16 RX ORDER — CEFTRIAXONE 2 G/50ML
2 INJECTION, SOLUTION INTRAVENOUS EVERY 24 HOURS
Status: DISCONTINUED | OUTPATIENT
Start: 2025-03-16 | End: 2025-03-20 | Stop reason: HOSPADM

## 2025-03-16 RX ORDER — GUAIFENESIN 100 MG/5ML
200 LIQUID ORAL 3 TIMES DAILY PRN
COMMUNITY

## 2025-03-16 RX ORDER — ALBUTEROL SULFATE 0.83 MG/ML
2.5 SOLUTION RESPIRATORY (INHALATION) ONCE
Status: COMPLETED | OUTPATIENT
Start: 2025-03-16 | End: 2025-03-16

## 2025-03-16 RX ORDER — CEFEPIME 1 G/50ML
2 INJECTION, SOLUTION INTRAVENOUS ONCE
Status: COMPLETED | OUTPATIENT
Start: 2025-03-16 | End: 2025-03-16

## 2025-03-16 RX ORDER — ENOXAPARIN SODIUM 100 MG/ML
40 INJECTION SUBCUTANEOUS DAILY
Status: DISCONTINUED | OUTPATIENT
Start: 2025-03-16 | End: 2025-03-20 | Stop reason: HOSPADM

## 2025-03-16 RX ORDER — ALBUTEROL SULFATE 0.83 MG/ML
3 SOLUTION RESPIRATORY (INHALATION) EVERY 2 HOUR PRN
Status: DISCONTINUED | OUTPATIENT
Start: 2025-03-16 | End: 2025-03-20 | Stop reason: HOSPADM

## 2025-03-16 RX ORDER — FLUTICASONE FUROATE AND VILANTEROL 200; 25 UG/1; UG/1
1 POWDER RESPIRATORY (INHALATION)
Status: DISCONTINUED | OUTPATIENT
Start: 2025-03-17 | End: 2025-03-20 | Stop reason: HOSPADM

## 2025-03-16 RX ORDER — ALBUTEROL SULFATE 0.83 MG/ML
3 SOLUTION RESPIRATORY (INHALATION) EVERY 4 HOURS PRN
Status: DISCONTINUED | OUTPATIENT
Start: 2025-03-16 | End: 2025-03-16

## 2025-03-16 RX ORDER — GUAIFENESIN 600 MG/1
600 TABLET, EXTENDED RELEASE ORAL 2 TIMES DAILY PRN
Status: DISCONTINUED | OUTPATIENT
Start: 2025-03-16 | End: 2025-03-20 | Stop reason: HOSPADM

## 2025-03-16 RX ADMIN — ALBUTEROL SULFATE 3 ML: 2.5 SOLUTION RESPIRATORY (INHALATION) at 23:02

## 2025-03-16 RX ADMIN — CEFTRIAXONE SODIUM 2 G: 2 INJECTION, SOLUTION INTRAVENOUS at 20:55

## 2025-03-16 RX ADMIN — MONTELUKAST 10 MG: 10 TABLET, FILM COATED ORAL at 21:01

## 2025-03-16 RX ADMIN — VANCOMYCIN HYDROCHLORIDE 1000 MG: 1 INJECTION, SOLUTION INTRAVENOUS at 21:44

## 2025-03-16 RX ADMIN — ALBUTEROL SULFATE 2.5 MG: 2.5 SOLUTION RESPIRATORY (INHALATION) at 11:52

## 2025-03-16 RX ADMIN — DIPHENHYDRAMINE HYDROCHLORIDE 50 MG: 50 INJECTION, SOLUTION INTRAMUSCULAR; INTRAVENOUS at 14:39

## 2025-03-16 RX ADMIN — IPRATROPIUM BROMIDE AND ALBUTEROL SULFATE 3 ML: .5; 3 SOLUTION RESPIRATORY (INHALATION) at 18:41

## 2025-03-16 RX ADMIN — AZITHROMYCIN 500 MG: 500 INJECTION, POWDER, LYOPHILIZED, FOR SOLUTION INTRAVENOUS at 20:55

## 2025-03-16 RX ADMIN — BENZONATATE 100 MG: 100 CAPSULE ORAL at 23:59

## 2025-03-16 RX ADMIN — GUAIFENESIN 600 MG: 600 TABLET, EXTENDED RELEASE ORAL at 21:01

## 2025-03-16 RX ADMIN — CEFEPIME 2 G: 1 INJECTION, SOLUTION INTRAVENOUS at 16:53

## 2025-03-16 RX ADMIN — IOHEXOL 75 ML: 350 INJECTION, SOLUTION INTRAVENOUS at 15:39

## 2025-03-16 RX ADMIN — METHYLPREDNISOLONE SODIUM SUCCINATE 125 MG: 125 INJECTION, POWDER, FOR SOLUTION INTRAMUSCULAR; INTRAVENOUS at 11:41

## 2025-03-16 ASSESSMENT — CURB65 SCORE
RESPIRATORY RATE GREATER THAN OR EQUAL TO 30: NO
AGE IS GREATER THAN OR EQUAL TO 65: YES
SBP LESS THAN 90 OR DBNP LESS THAN 60: NO

## 2025-03-16 ASSESSMENT — PAIN SCALES - GENERAL: PAINLEVEL_OUTOF10: 7

## 2025-03-16 ASSESSMENT — PAIN DESCRIPTION - DESCRIPTORS: DESCRIPTORS: PRESSURE;TIGHTNESS

## 2025-03-16 NOTE — ED TRIAGE NOTES
Patient arrives from home with complaints of chest pressure and tightness that has been ongoing for the past 2 days.  Patient also notes SOB and has a history of asthma.  Patient states she has been coughing constantly for the past 3 days.  Patient also states she was diagnosed with a collapsed lung back in November.

## 2025-03-16 NOTE — ED PROVIDER NOTES
69-year-old female with failure of outpatient treatment to pneumonia is a signout from the prior ED physician Dr. Falcon, pending CTA report.  Patient has already received antibiotic for her pneumonia.  Plan will be for admission once workup has been completed in the ED.    4:50 PM CTA report is back and reviewed. IMPRESSION:  1. No central pulmonary embolism, aortic aneurysm or dissection.  2. Endobronchial hypodensity with occlusion of the right middle lobe  bronchus and occlusion of the posterior segmental branch of the right  lower lobe bronchus. Again seen is consolidative airspace opacity in  the right middle lobe with air bronchograms, not significantly changed  in the interval since the recent comparison examination. Follow-up can  document resolution to exclude any underlying pathology.  3. Prominent mediastinal and right hilar lymph nodes, possibly  reactive however remain nonspecific and amenable to follow-up.  Signed by Foster Watson MD    Will place call out to hospitalist.    4:54 PM patient was evaluated at bedside laboratory results and radiology results were discussed with her and  at bedside.  She is aware that we will bring her in for pneumonia management in the setting of failure of outpatient treatment.  COVID and flu were negative.  No leukocytosis noted.  Vital signs currently stable.  Patient currently is hemodynamically stable and she is not on any O2 supplementation.  Patient is in no acute distress after receiving this contrast for her CTA.  All of her questions were answered.  Patient agree with admission to the hospital.     5:02 Call from the hospitalist team Dr Teixeira received.  Case was discussed and they agree with admission to their service. I also spoke with the NP about the case.     Edita Herrmann MD  03/16/25 [id number]

## 2025-03-16 NOTE — PROGRESS NOTES
Pharmacy Medication History Review    Nicolle Campbell is a 69 y.o. female admitted for No Principal Problem: There is no principal problem currently on the Problem List. Please update the Problem List and refresh.. Pharmacy reviewed the patient's [id number] medications and allergies for accuracy.    The list below reflectives the updated PTA list. Please review each medication in order reconciliation for additional clarification and justification.  Prior to Admission medications    Medication Sig Start Date End Date Taking? Authorizing Provider   guaiFENesin (Robitussin) 100 mg/5 mL syrup Take 10 mL (200 mg) by mouth 3 times a day as needed for cough.   Yes Historical Provider, MD   albuterol sulfate (Proair Digihaler) 90 mcg/actuation aero powdr breath act w/sensor inhaler Inhale 2 puffs every 6 hours if needed for wheezing. 4/2/24  Yes Clary Clements DO   budesonide-formoteroL (Symbicort) 160-4.5 mcg/actuation inhaler Inhale 2 puffs 2 times a day. 4/2/24  Yes Clary Clements DO   ciprofloxacin (Cipro) 250 mg tablet Take 1 tablet (250 mg) by mouth 2 times a day for 10 days. 2/28/25 3/10/25 no Aaron Farias MD   estradiol (Estrace) 0.01 % (0.1 mg/gram) vaginal cream Insert into the vagina.  Patient not taking: Reported on 3/16/2025 10/29/21  no Historical Provider, MD   fexofenadine-pseudoephedrine (Allegra-D)  mg 12 hr tablet Take 1 tablet by mouth once daily in the morning.   Yes Historical Provider, MD   ibandronate (Boniva) 150 mg tablet Take 1 tablet (150 mg) by mouth every 30 (thirty) days. Take in morning with full glass of water on an empty stomach. No food, drink, meds, or lying down for 60 minutes after.  Patient not taking: Reported on 3/16/2025 7/14/23 7/13/24 no Jacoby Conklin DO   ipratropium-albuteroL (Duo-Neb) 0.5-2.5 mg/3 mL nebulizer solution Use 3 times per day  Patient taking differently: Take 3 mL by nebulization 4 times a day as needed for wheezing or shortness of  breath. Use 3 times per day 2/24/25  Yes Aaron Farias MD   montelukast (Singulair) 10 mg tablet Take 1 tablet (10 mg) by mouth once daily at bedtime. 2/17/25 2/17/26 Yes Aaron Farias MD        The list below reflectives the updated allergy list. Please review each documented allergy for additional clarification and justification.  Allergies  Reviewed by Сергей Spivey RN on 3/16/2025        Severity Reactions Comments    Bee Pollen Not Specified Unknown     Corn Not Specified Unknown     Penicillin Not Specified Hives, Swelling     Prednisone Not Specified Unknown     Wheat Not Specified Unknown             Below are additional concerns with the patient's PTA list.      Alma Hood

## 2025-03-16 NOTE — H&P
History Of Present Illness  Nicolle Campbell is a 69 y.o. female  With a past medical history of asthma, pneumonia, right lung collapse, seasonal allergies who [facility] of shortness of breath.  Patient reports she went to see her pulmonologist and was given doxycycline for pneumonia which she completed.  She states she started feeling worse on Friday complaining of fatigue, decreased appetite, and shortness of breath which began around 4 AM this morning.  She denies any aggravating or alleviating factors.  She reports that she feels shortness of breath at rest and with exertion.  She is reporting a productive cough with yellow sputum.  She denies any fevers or chills.  She is reporting chest pain which worsens with cough and palpation.  No reports of nausea vomiting.    ED course: Afebrile, hemodynamically stable, tachypneic with respiratory rate 21-36.  Labs notable for glucose 110, sodium 135.  Troponin negative x 2.  D-dimer 720, platelet 452, eosinophils 1.23.  Flu A/COVID-negative.  CTA chest negative for PE.  Revealing endobronchial hypodensity with occlusion of the right middle lobe bronchus and occlusion of the posterior segmental branch of the right lower lobe bronchus.  Full impression to follow.  Cefepime, azithromycin, vancomycin, Solu-Medrol, Benadryl in ER.    10 point review of systems has been performed and is negative except what is stated above    Patient has been under the care of Dr. Teixeira with consult placed to pulmonology for further medical management      Past Medical History  Past Medical History:   Diagnosis Date    Acute sinusitis, unspecified 07/19/2018    Acute sinusitis    Acute upper respiratory infection, unspecified 07/19/2018    URI (upper respiratory infection)    Concussion without loss of consciousness, initial encounter 07/19/2018    Concussion w/o coma    Dizziness and giddiness 07/19/2018    Vertigo    Encounter for immunization 12/20/2022    Need for  vaccination    Epistaxis 07/19/2018    Epistaxis    Frequency of micturition 07/19/2018    Urinary frequency    Headache 07/19/2018    Cephalgia    Laceration without foreign body, right lower leg, initial encounter 07/19/2018    Laceration of right lower extremity, initial encounter    Moderate persistent asthma, uncomplicated ([facility]) 07/19/2018    Asthma, moderate persistent    Other allergy status, other than to drugs and biological substances     Environmental allergies    Other malaise 07/19/2018    Malaise and fatigue    Personal history of other diseases of the respiratory system 02/01/2018    History of upper respiratory infection    Personal history of other diseases of the respiratory system 08/16/2021    History of acute sinusitis    Personal history of other diseases of the respiratory system 07/09/2015    History of asthma    Personal history of other specified conditions 07/09/2015    History of abnormal Pap smear    Unspecified fall, initial encounter 06/07/2016    Fall    Unspecified hearing loss, bilateral 07/19/2018    Hearing loss, bilateral    Unspecified otitis externa, left ear 03/19/2021    Left otitis externa    Urinary tract infection, site not specified 07/19/2018    UTI (urinary tract infection)       Surgical History  Past Surgical History:   Procedure Laterality Date    OTHER SURGICAL HISTORY  06/29/2021    Cataract surgery        Social History  She reports that she has never smoked. She has never used smokeless tobacco. She reports that she does not drink alcohol and does not use drugs.    Family History  Family History   Problem Relation Name Age of Onset    No Known Problems Mother      Diabetes Father      Diabetes Daughter          Allergies  Bee pollen, Corn, Penicillin, Prednisone, and Wheat       Physical Exam  Constitutional:       Appearance: She is normal weight.   HENT:      Nose: Nose normal.      Mouth/Throat:      Mouth: Mucous membranes are moist.      Pharynx:  "Oropharynx is clear.   Eyes:      Conjunctiva/sclera: Conjunctivae normal.   Cardiovascular:      Rate and Rhythm: Normal rate and regular rhythm.      Heart sounds: Normal heart sounds.   Pulmonary:      Effort: Pulmonary effort is normal.      Comments: Diminished bilateral bases  Abdominal:      General: Abdomen is flat.      Palpations: Abdomen is soft.   Musculoskeletal:         General: Normal range of motion.      Cervical back: Normal range of motion and neck supple.   Skin:     General: Skin is warm.   Neurological:      General: No focal deficit present.      Mental Status: She is alert.          Last Recorded Vitals  Blood pressure 144/65, pulse 94, temperature 36.8 °C (98.2 °F), temperature source Temporal, resp. rate (!) 37, height 1.676 m (5' 6\"), weight 49.9 kg (110 lb), SpO2 96%.    Scheduled medications  azithromycin, 500 mg, intravenous, Once  azithromycin, 500 mg, intravenous, q24h  cefTRIAXone, 2 g, intravenous, q24h  enoxaparin, 40 mg, subcutaneous, Daily  [START ON 3/17/2025] fluticasone furoate-vilanteroL, 1 puff, inhalation, Daily  ipratropium-albuteroL, 3 mL, nebulization, q6h  [START ON 3/17/2025] methylPREDNISolone sodium succinate (PF), 40 mg, intravenous, q24h  montelukast, 10 mg, oral, Nightly  vancomycin, 1,000 mg, intravenous, Once      Continuous medications     PRN medications  PRN medications: albuterol, guaiFENesin, oxygen  CT angio chest for pulmonary embolism    Result Date: 3/16/2025  STUDY: CT Angiogram of the Chest; 3/16/2025 3:41 PM INDICATION: Chest pain. COMPARISON: CT chest 3/16/2025. ACCESSION NUMBER(S): [id number] ORDERING CLINICIAN: KALA KELLY TECHNIQUE:  CTA of the chest was performed with intravenous contrast. Images are reviewed and processed at a workstation according to the CT angiogram protocol with 3-D and/or MIP post processing imaging generated.  Omnipaque 350 75 mL was administered intravenously. Automated mA/kV exposure control was utilized and " patient examination was performed in strict accordance with principles of ALARA. FINDINGS: No central filling defect is seen within the main, lobar or proximal segmental pulmonary arteries to suggest an acute pulmonary embolism.. No thoracic aortic aneurysm, dissection or periaortic inflammation is seen.. Heart remains normal in size without pericardial or significant pleural effusion..  Multiple prominent mediastinal and right hilar lymph nodes are seen, possibly reactive however remain nonspecific and amenable to follow-up. There is no pleural effusion, pleural thickening, or pneumothorax. The trachea and right and left mainstem bronchi are patent. There is endobronchial hypodensity with occlusion of the right middle lobe bronchus and occlusion of the posterior segmental branch of the right lower lobe bronchus, series 506 image 154 and 176.. Again seen is consolidative airspace opacity in the right middle lobe with air bronchograms, not significantly changed in the interval since the recent comparison examination. Atelectasis and small amount of superposed infiltrate also seen at the right lung base, stable from the comparison examination. Stable biapical fibrosis is again seen.. A 6 mm enhancing focus is seen in the right posterior hepatic segment, series 501 image 284, possibly a flash filling hemangioma however remains nonspecific on a single phase CT. There are no acute fractures.  No suspicious bony lesions.    1. No central pulmonary embolism, aortic aneurysm or dissection. 2. Endobronchial hypodensity with occlusion of the right middle lobe bronchus and occlusion of the posterior segmental branch of the right lower lobe bronchus. Again seen is consolidative airspace opacity in the right middle lobe with air bronchograms, not significantly changed in the interval since the recent comparison examination. Follow-up can document resolution to exclude any underlying pathology. 3. Prominent mediastinal and right  hilar lymph nodes, possibly reactive however remain nonspecific and amenable to follow-up. Signed by Foster Watson MD    CT chest wo IV contrast    Result Date: 3/16/2025  STUDY: CT Chest without IV Contrast; 03/16/2025, 2:31 PM INDICATION: Cough. COMPARISON: CT chest 01/22/2025,11/30/2024. ACCESSION NUMBER(S): [id number] ORDERING CLINICIAN: KALA KELLY TECHNIQUE:  CT of the chest was performed without contrast. Automated mA/kV exposure control was utilized and patient examination was performed in strict accordance with principles of ALARA. FINDINGS: MEDIASTINUM: The heart is normal in size without pericardial effusion.  Coronary artery calcifications are identified.  LUNGS/PLEURA: There is no pleural effusion, pleural thickening, or pneumothorax. The airways are patent. There is a dense consolidating infiltrate in the right middle lobe. There is a small infiltrate posteriorly in the right lower lobe. LYMPH NODES: Thoracic lymph nodes are not enlarged. UPPER ABDOMEN: Upper abdomen demonstrates no acute pathology. BONES: There are no acute fractures.  No suspicious bony lesions.    Consolidating infiltrate in the right middle lobe with small posterior right lower lobe infiltrate.  These findings have increased from January 2025. Signed by Gerald Neely MD    XR chest 1 view    Result Date: 3/16/2025  STUDY: Chest Radiograph; 03/16/2025 9:54 AM INDICATION: Chest pain. COMPARISON: XR chest 11/30/2024, 12/06/2023. ACCESSION NUMBER(S): [id number] ORDERING CLINICIAN: KLAA KELLY TECHNIQUE:  Frontal chest was obtained at 09:54:00 hours. FINDINGS: CARDIOMEDIASTINAL SILHOUETTE: Cardiomediastinal silhouette is normal in size and configuration.  LUNGS: Patchy consolidation in the right base.  Underlying emphysema.  ABDOMEN: No remarkable upper abdominal findings.  BONES: No acute osseous changes.    Patchy consolidation in the right base. Correlate clinically for pneumonia. Signed by Reilly Lama MD   Results  for orders placed or performed during the hospital encounter of 03/16/25 (from the past 24 hours)   CBC and Auto Differential   Result Value Ref Range    WBC 8.6 4.4 - 11.3 x10*3/uL    nRBC 0.0 0.0 - 0.0 /100 WBCs    RBC 4.55 4.00 - 5.20 x10*6/uL    Hemoglobin 12.3 12.0 - 16.0 g/dL    Hematocrit 38.2 36.0 - 46.0 %    MCV 84 80 - 100 fL    MCH 27.0 26.0 - 34.0 pg    MCHC 32.2 32.0 - 36.0 g/dL    RDW 13.8 11.5 - 14.5 %    Platelets 452 (H) 150 - 450 x10*3/uL    Neutrophils % 60.5 40.0 - 80.0 %    Immature Granulocytes %, Automated 0.1 0.0 - 0.9 %    Lymphocytes % 18.1 13.0 - 44.0 %    Monocytes % 6.5 2.0 - 10.0 %    Eosinophils % 14.3 0.0 - 6.0 %    Basophils % 0.5 0.0 - 2.0 %    Neutrophils Absolute 5.20 1.20 - 7.70 x10*3/uL    Immature Granulocytes Absolute, Automated 0.01 0.00 - 0.70 x10*3/uL    Lymphocytes Absolute 1.56 1.20 - 4.80 x10*3/uL    Monocytes Absolute 0.56 0.10 - 1.00 x10*3/uL    Eosinophils Absolute 1.23 (H) 0.00 - 0.70 x10*3/uL    Basophils Absolute 0.04 0.00 - 0.10 x10*3/uL   Comprehensive metabolic panel   Result Value Ref Range    Glucose 110 (H) 74 - 99 mg/dL    Sodium 135 (L) 136 - 145 mmol/L    Potassium 4.9 3.5 - 5.3 mmol/L    Chloride 102 98 - 107 mmol/L    Bicarbonate 27 21 - 32 mmol/L    Anion Gap 11 10 - 20 mmol/L    Urea Nitrogen 13 6 - 23 mg/dL    Creatinine 0.82 0.50 - 1.05 mg/dL    eGFR 77 >60 mL/min/1.73m*2    Calcium 9.2 8.6 - 10.3 mg/dL    Albumin 4.1 3.4 - 5.0 g/dL    Alkaline Phosphatase 104 33 - 136 U/L    Total Protein 7.5 6.4 - 8.2 g/dL    AST 24 9 - 39 U/L    Bilirubin, Total 0.4 0.0 - 1.2 mg/dL    ALT 20 7 - 45 U/L   B-Type Natriuretic Peptide   Result Value Ref Range    BNP 36 0 - 99 pg/mL   D-Dimer, VTE Exclusion   Result Value Ref Range    D-Dimer, Quantitative VTE Exclusion 720 (H) <=500 ng/mL FEU   Sars-CoV-2 and Influenza A/B PCR   Result Value Ref Range    Flu A Result Not Detected Not Detected    Flu B Result Not Detected Not Detected    Coronavirus 2019, PCR Not  Detected Not Detected   RSV PCR   Result Value Ref Range    RSV PCR Not Detected Not Detected   Troponin I, High Sensitivity, Initial   Result Value Ref Range    Troponin I, High Sensitivity 7 0 - 13 ng/L   Troponin, High Sensitivity, 1 Hour   Result Value Ref Range    Troponin I, High Sensitivity 7 0 - 13 ng/L   MRSA Surveillance for Vancomycin De-escalation, PCR    Specimen: Anterior Nares; Swab   Result Value Ref Range    MRSA PCR Not Detected Not Detected                  Assessment/Plan   Assessment & Plan  Dyspnea    Pneumonia      Nicolle Campbell is a 69 y.o. female  With a past medical history of asthma, pneumonia, right lung collapse, seasonal allergies who presents to [facility] for evaluation of shortness of breath.  Patient reports she went to see her pulmonologist and was given doxycycline for pneumonia which she completed.  She states she started feeling worse on Friday complaining of fatigue, decreased appetite, and shortness of breath which began around 4 AM this morning.  She denies any aggravating or alleviating factors.  She reports that she feels shortness of breath at rest and with exertion.  She is reporting a productive cough with yellow sputum.  She denies any fevers or chills.  She is reporting chest pain which worsens with cough and palpation.  No reports of nausea vomiting.    #Pneumonia  #Shortness of breath  #History asthma  Admit under the care of Dr. Teixeira  Consult pulmonology, appreciate input  cefepime, azithromycin, vancomycin in ER-continue azithromycin/rocephin-defer vanco to attending/pulmonology   Continue Solu-Medrol  Continue home inhalers  Urine strep/Legionella  Sputum culture  Supplemental O2 as needed  mucinex    #DVT ppx  Lovenox  SCD       I spent 30 minutes in the professional and overall care of this patient.      Roshni Levin, APRN-CNP

## 2025-03-16 NOTE — LETTER
March 20, 2025     Patient: Nicolle Campbell   YOB: 1955   Date of Visit: 3/16/2025       To Whom It May Concern:    Nicolle Campbell was admitted at [facility] from 3/16 - 3/20. Please excuse Nicolle for her absence from work, and allow for recovery next week, patient okay to return to work on Monday, March 31st.    If you have any questions or concerns, please don't hesitate to call.         Sincerely, Jake Sharma PA-C

## 2025-03-16 NOTE — ED PROVIDER NOTES
HPI   Chief Complaint   Patient presents with    Chest Pain    Shortness of Breath     Patient arrives from home with complaints of chest pressure and tightness that has been ongoing for the past 2 days.  Patient also notes SOB and has a history of asthma.  Patient states she has been coughing constantly for the past 3 days.  Patient also states she was diagnosed with a collapsed lung back in November.        76-year-old female presenting with chest pain which is right-sided.  Significant past medical history for asthma, lung collapse right middle lobe.  Patient states that she was diagnosed with right middle lobe collapse in November.  She is been following with the pulmonologist at [facility].  Patient was on 2 weeks of Levaquin and then 10 days of doxycycline.  She has been off of these antibiotics for couple weeks now.  Patient states that she is getting worsening pain in the right side of her chest and she feels like she cannot catch her breath.  She has been using her breathing treatments without success.  She denies fevers or chills but she does feel like she is weak.  She states he is coughing a lot more.              Patient History   Past Medical History:   Diagnosis Date    Acute sinusitis, unspecified 07/19/2018    Acute sinusitis    Acute upper respiratory infection, unspecified 07/19/2018    URI (upper respiratory infection)    Concussion without loss of consciousness, initial encounter 07/19/2018    Concussion w/o coma    Dizziness and giddiness 07/19/2018    Vertigo    Encounter for immunization 12/20/2022    Need for vaccination    Epistaxis 07/19/2018    Epistaxis    Frequency of micturition 07/19/2018    Urinary frequency    Headache 07/19/2018    Cephalgia    Laceration without foreign body, right lower leg, initial encounter 07/19/2018    Laceration of right lower extremity, initial encounter    Moderate persistent asthma, uncomplicated ([facility]) 07/19/2018    Asthma, moderate  persistent    Other allergy status, other than to drugs and biological substances     Environmental allergies    Other malaise 07/19/2018    Malaise and fatigue    Personal history of other diseases of the respiratory system 02/01/2018    History of upper respiratory infection    Personal history of other diseases of the respiratory system 08/16/2021    History of acute sinusitis    Personal history of other diseases of the respiratory system 07/09/2015    History of asthma    Personal history of other specified conditions 07/09/2015    History of abnormal Pap smear    Unspecified fall, initial encounter 06/07/2016    Fall    Unspecified hearing loss, bilateral 07/19/2018    Hearing loss, bilateral    Unspecified otitis externa, left ear 03/19/2021    Left otitis externa    Urinary tract infection, site not specified 07/19/2018    UTI (urinary tract infection)     Past Surgical History:   Procedure Laterality Date    OTHER SURGICAL HISTORY  06/29/2021    Cataract surgery     Family History   Problem Relation Name Age of Onset    No Known Problems Mother      Diabetes Father      Diabetes Daughter       Social History     Tobacco Use    Smoking status: Never    Smokeless tobacco: Never   Vaping Use    Vaping status: Never Used   Substance Use Topics    Alcohol use: Never    Drug use: Never       Physical Exam   ED Triage Vitals [03/16/25 0908]   Temperature Heart Rate Respirations BP   36.8 °C (98.2 °F) 84 (!) 24 126/61      Pulse Ox Temp Source Heart Rate Source Patient Position   95 % Temporal Monitor Sitting      BP Location FiO2 (%)     Right arm --       Physical Exam      ED Course & MDM   Diagnoses as of 03/16/25 1701   Pneumonia due to infectious organism, unspecified laterality, unspecified part of lung   Failure of outpatient treatment   Chest pain, unspecified type   Shortness of breath                 No data recorded     [place] Coma Scale Score: 15 (03/16/25 0909 : Сергей Spivey RN)                "            Medical Decision Making  69 year-old female presenting with failure of outpatient therapy.  She has previously been on Levaquin and doxycycline with no success.  Patient has history of right middle lobe collapse.  She is seeing pulmonology at [facility].  She states she just feels like she is getting worse.  On exam patient had mild wheezing.  She was given Solu-Medrol 125 mg as well as breathing treatments.  Her EKG showed a sinus rhythm without ST elevation or depression.  No dysrhythmia.  Chest x-ray appears to have right lower lobe pneumonia.  High-sensitivity troponin is 7.  BNP 36.  CBC and BMP ultimately unremarkable.  I attempted to obtain a CT PE however the patient upon arriving to CT ultimately noted that she had a contrast allergy which she had not previously told us.  She was brought back to the room.  We did discuss doing a noncontrast CT scan.  Patient is really not sure what her symptoms were except to say \"respiratory.\"  For this reason she had a CT of the chest which showed dense consolidation and infiltrate in the right middle lobe as well as small infiltrate in the right lower lobe.  Unfortunately her D-dimer came back elevated after this.  She had already been premedicated with Solu-Medrol show she was given Benadryl 50 mg.  She tolerated the CT angiogram without difficulty.  CT angiogram is pending.  patient was medicated with vancomycin, cefepime, azithromycin. Patient was signed out to incoming ED physician for monitoring and ultimately will need to be admitted for failure of outpatient antibiotics.        Procedure  Procedures     Fletcher Falcon, DO  03/16/25 1706    "

## 2025-03-17 VITALS
WEIGHT: 110 LBS | SYSTOLIC BLOOD PRESSURE: 116 MMHG | HEART RATE: 103 BPM | HEIGHT: 66 IN | RESPIRATION RATE: 23 BRPM | BODY MASS INDEX: 17.68 KG/M2 | DIASTOLIC BLOOD PRESSURE: 55 MMHG | TEMPERATURE: 98.2 F | OXYGEN SATURATION: 93 %

## 2025-03-17 DIAGNOSIS — J45.41 MODERATE PERSISTENT ASTHMA WITH ACUTE EXACERBATION (HHS-HCC): ICD-10-CM

## 2025-03-17 DIAGNOSIS — J18.1: ICD-10-CM

## 2025-03-17 LAB
ANION GAP SERPL CALC-SCNC: 13 MMOL/L (ref 10–20)
BUN SERPL-MCNC: 19 MG/DL (ref 6–23)
CALCIUM SERPL-MCNC: 9.2 MG/DL (ref 8.6–10.3)
CHLORIDE SERPL-SCNC: 104 MMOL/L (ref 98–107)
CO2 SERPL-SCNC: 22 MMOL/L (ref 21–32)
CREAT SERPL-MCNC: 0.85 MG/DL (ref 0.5–1.05)
EGFRCR SERPLBLD CKD-EPI 2021: 74 ML/MIN/1.73M*2
ERYTHROCYTE [DISTWIDTH] IN BLOOD BY AUTOMATED COUNT: 14 % (ref 11.5–14.5)
GLUCOSE SERPL-MCNC: 135 MG/DL (ref 74–99)
HCT VFR BLD AUTO: 35.5 % (ref 36–46)
HGB BLD-MCNC: 11.5 G/DL (ref 12–16)
LEGIONELLA AG UR QL: NEGATIVE
MCH RBC QN AUTO: 26.6 PG (ref 26–34)
MCHC RBC AUTO-ENTMCNC: 32.4 G/DL (ref 32–36)
MCV RBC AUTO: 82 FL (ref 80–100)
NRBC BLD-RTO: 0 /100 WBCS (ref 0–0)
PLATELET # BLD AUTO: 443 X10*3/UL (ref 150–450)
POTASSIUM SERPL-SCNC: 3.8 MMOL/L (ref 3.5–5.3)
RBC # BLD AUTO: 4.32 X10*6/UL (ref 4–5.2)
S PNEUM AG UR QL: NEGATIVE
SODIUM SERPL-SCNC: 135 MMOL/L (ref 136–145)
WBC # BLD AUTO: 12 X10*3/UL (ref 4.4–11.3)

## 2025-03-17 PROCEDURE — 2500000002 HC RX 250 W HCPCS SELF ADMINISTERED DRUGS (ALT 637 FOR MEDICARE OP, ALT 636 FOR OP/ED): Performed by: EMERGENCY MEDICINE

## 2025-03-17 PROCEDURE — 1200000002 HC GENERAL ROOM WITH TELEMETRY DAILY

## 2025-03-17 PROCEDURE — 85027 COMPLETE CBC AUTOMATED: CPT | Performed by: NURSE PRACTITIONER

## 2025-03-17 PROCEDURE — 2500000001 HC RX 250 WO HCPCS SELF ADMINISTERED DRUGS (ALT 637 FOR MEDICARE OP)

## 2025-03-17 PROCEDURE — 87899 AGENT NOS ASSAY W/OPTIC: CPT | Mod: PARLAB | Performed by: NURSE PRACTITIONER

## 2025-03-17 PROCEDURE — 2500000001 HC RX 250 WO HCPCS SELF ADMINISTERED DRUGS (ALT 637 FOR MEDICARE OP): Performed by: NURSE PRACTITIONER

## 2025-03-17 PROCEDURE — 94640 AIRWAY INHALATION TREATMENT: CPT

## 2025-03-17 PROCEDURE — 80048 BASIC METABOLIC PNL TOTAL CA: CPT | Performed by: NURSE PRACTITIONER

## 2025-03-17 PROCEDURE — 36415 COLL VENOUS BLD VENIPUNCTURE: CPT | Performed by: NURSE PRACTITIONER

## 2025-03-17 PROCEDURE — 2500000004 HC RX 250 GENERAL PHARMACY W/ HCPCS (ALT 636 FOR OP/ED): Performed by: NURSE PRACTITIONER

## 2025-03-17 PROCEDURE — 87449 NOS EACH ORGANISM AG IA: CPT | Mod: PARLAB | Performed by: NURSE PRACTITIONER

## 2025-03-17 RX ORDER — ACETAMINOPHEN 325 MG/1
650 TABLET ORAL EVERY 4 HOURS PRN
Status: DISCONTINUED | OUTPATIENT
Start: 2025-03-17 | End: 2025-03-20 | Stop reason: HOSPADM

## 2025-03-17 RX ADMIN — IPRATROPIUM BROMIDE AND ALBUTEROL SULFATE 3 ML: .5; 3 SOLUTION RESPIRATORY (INHALATION) at 19:44

## 2025-03-17 RX ADMIN — CEFTRIAXONE SODIUM 2 G: 2 INJECTION, SOLUTION INTRAVENOUS at 18:39

## 2025-03-17 RX ADMIN — AZITHROMYCIN MONOHYDRATE 500 MG: 500 INJECTION, POWDER, LYOPHILIZED, FOR SOLUTION INTRAVENOUS at 15:19

## 2025-03-17 RX ADMIN — ACETAMINOPHEN 650 MG: 325 TABLET, FILM COATED ORAL at 04:53

## 2025-03-17 RX ADMIN — FLUTICASONE FUROATE AND VILANTEROL TRIFENATATE 1 PUFF: 200; 25 POWDER RESPIRATORY (INHALATION) at 07:36

## 2025-03-17 RX ADMIN — MONTELUKAST 10 MG: 10 TABLET, FILM COATED ORAL at 20:53

## 2025-03-17 RX ADMIN — BENZONATATE 100 MG: 100 CAPSULE ORAL at 19:42

## 2025-03-17 RX ADMIN — IPRATROPIUM BROMIDE AND ALBUTEROL SULFATE 3 ML: .5; 3 SOLUTION RESPIRATORY (INHALATION) at 13:19

## 2025-03-17 RX ADMIN — IPRATROPIUM BROMIDE AND ALBUTEROL SULFATE 3 ML: .5; 3 SOLUTION RESPIRATORY (INHALATION) at 07:34

## 2025-03-17 SDOH — SOCIAL STABILITY: SOCIAL INSECURITY
WITHIN THE LAST YEAR, HAVE YOU BEEN RAPED OR FORCED TO HAVE ANY KIND OF SEXUAL ACTIVITY BY YOUR PARTNER OR EX-PARTNER?: NO

## 2025-03-17 SDOH — ECONOMIC STABILITY: FOOD INSECURITY: WITHIN THE PAST 12 MONTHS, YOU WORRIED THAT YOUR FOOD WOULD RUN OUT BEFORE YOU GOT THE MONEY TO BUY MORE.: NEVER TRUE

## 2025-03-17 SDOH — SOCIAL STABILITY: SOCIAL INSECURITY: WITHIN THE LAST YEAR, HAVE YOU BEEN HUMILIATED OR EMOTIONALLY ABUSED IN OTHER WAYS BY YOUR PARTNER OR EX-PARTNER?: NO

## 2025-03-17 SDOH — ECONOMIC STABILITY: INCOME INSECURITY: IN THE PAST 12 MONTHS HAS THE ELECTRIC, GAS, OIL, OR WATER COMPANY THREATENED TO SHUT OFF SERVICES IN YOUR HOME?: NO

## 2025-03-17 SDOH — SOCIAL STABILITY: SOCIAL INSECURITY: WITHIN THE LAST YEAR, HAVE YOU BEEN AFRAID OF YOUR PARTNER OR EX-PARTNER?: NO

## 2025-03-17 SDOH — SOCIAL STABILITY: SOCIAL INSECURITY: DO YOU FEEL UNSAFE GOING BACK TO THE PLACE WHERE YOU ARE LIVING?: NO

## 2025-03-17 SDOH — SOCIAL STABILITY: SOCIAL INSECURITY: DOES ANYONE TRY TO KEEP YOU FROM HAVING/CONTACTING OTHER FRIENDS OR DOING THINGS OUTSIDE YOUR HOME?: NO

## 2025-03-17 SDOH — SOCIAL STABILITY: SOCIAL INSECURITY: DO YOU FEEL ANYONE HAS EXPLOITED OR TAKEN ADVANTAGE OF YOU FINANCIALLY OR OF YOUR PERSONAL PROPERTY?: NO

## 2025-03-17 SDOH — SOCIAL STABILITY: SOCIAL INSECURITY: ARE YOU OR HAVE YOU BEEN THREATENED OR ABUSED PHYSICALLY, EMOTIONALLY, OR SEXUALLY BY ANYONE?: NO

## 2025-03-17 SDOH — SOCIAL STABILITY: SOCIAL INSECURITY
WITHIN THE LAST YEAR, HAVE YOU BEEN KICKED, HIT, SLAPPED, OR OTHERWISE PHYSICALLY HURT BY YOUR PARTNER OR EX-PARTNER?: NO

## 2025-03-17 SDOH — SOCIAL STABILITY: SOCIAL INSECURITY: ARE THERE ANY APPARENT SIGNS OF INJURIES/BEHAVIORS THAT COULD BE RELATED TO ABUSE/NEGLECT?: NO

## 2025-03-17 SDOH — SOCIAL STABILITY: SOCIAL INSECURITY: HAVE YOU HAD ANY THOUGHTS OF HARMING ANYONE ELSE?: NO

## 2025-03-17 SDOH — SOCIAL STABILITY: SOCIAL INSECURITY: HAS ANYONE EVER THREATENED TO HURT YOUR FAMILY OR YOUR PETS?: NO

## 2025-03-17 SDOH — SOCIAL STABILITY: SOCIAL INSECURITY: WERE YOU ABLE TO COMPLETE ALL THE BEHAVIORAL HEALTH SCREENINGS?: YES

## 2025-03-17 SDOH — SOCIAL STABILITY: SOCIAL INSECURITY: ABUSE: ADULT

## 2025-03-17 SDOH — SOCIAL STABILITY: SOCIAL INSECURITY: HAVE YOU HAD THOUGHTS OF HARMING ANYONE ELSE?: NO

## 2025-03-17 SDOH — ECONOMIC STABILITY: FOOD INSECURITY: WITHIN THE PAST 12 MONTHS, THE FOOD YOU BOUGHT JUST DIDN'T LAST AND YOU DIDN'T HAVE MONEY TO GET MORE.: NEVER TRUE

## 2025-03-17 ASSESSMENT — ACTIVITIES OF DAILY LIVING (ADL)
WALKS IN HOME: INDEPENDENT
HEARING - RIGHT EAR: FUNCTIONAL
BATHING: INDEPENDENT
DRESSING YOURSELF: INDEPENDENT
FEEDING YOURSELF: INDEPENDENT
GROOMING: INDEPENDENT
ADEQUATE_TO_COMPLETE_ADL: YES
JUDGMENT_ADEQUATE_SAFELY_COMPLETE_DAILY_ACTIVITIES: YES
TOILETING: INDEPENDENT
PATIENT'S MEMORY ADEQUATE TO SAFELY COMPLETE DAILY ACTIVITIES?: YES
LACK_OF_TRANSPORTATION: NO
HEARING - LEFT EAR: FUNCTIONAL

## 2025-03-17 ASSESSMENT — PAIN SCALES - GENERAL: PAINLEVEL_OUTOF10: 7

## 2025-03-17 ASSESSMENT — LIFESTYLE VARIABLES
AUDIT-C TOTAL SCORE: 0
SKIP TO QUESTIONS 9-10: 1
SUBSTANCE_ABUSE_PAST_12_MONTHS: NO
PRESCIPTION_ABUSE_PAST_12_MONTHS: NO
HOW OFTEN DO YOU HAVE 6 OR MORE DRINKS ON ONE OCCASION: NEVER
AUDIT-C TOTAL SCORE: 0
HOW OFTEN DO YOU HAVE A DRINK CONTAINING ALCOHOL: NEVER
HOW MANY STANDARD DRINKS CONTAINING ALCOHOL DO YOU HAVE ON A TYPICAL DAY: PATIENT DOES NOT DRINK

## 2025-03-17 ASSESSMENT — COGNITIVE AND FUNCTIONAL STATUS - GENERAL
TOILETING: A LITTLE
HELP NEEDED FOR BATHING: A LITTLE
CLIMB 3 TO 5 STEPS WITH RAILING: A LITTLE
MOVING TO AND FROM BED TO CHAIR: A LITTLE
MOBILITY SCORE: 20
STANDING UP FROM CHAIR USING ARMS: A LITTLE
PATIENT BASELINE BEDBOUND: NO
DAILY ACTIVITIY SCORE: 22
WALKING IN HOSPITAL ROOM: A LITTLE

## 2025-03-17 ASSESSMENT — PAIN - FUNCTIONAL ASSESSMENT: PAIN_FUNCTIONAL_ASSESSMENT: 0-10

## 2025-03-17 ASSESSMENT — PAIN DESCRIPTION - DESCRIPTORS: DESCRIPTORS: ACHING

## 2025-03-17 ASSESSMENT — PAIN DESCRIPTION - PAIN TYPE: TYPE: ACUTE PAIN

## 2025-03-17 ASSESSMENT — PATIENT HEALTH QUESTIONNAIRE - PHQ9
1. LITTLE INTEREST OR PLEASURE IN DOING THINGS: NOT AT ALL
SUM OF ALL RESPONSES TO PHQ9 QUESTIONS 1 & 2: 0
2. FEELING DOWN, DEPRESSED OR HOPELESS: NOT AT ALL

## 2025-03-17 ASSESSMENT — COLUMBIA-SUICIDE SEVERITY RATING SCALE - C-SSRS
6. HAVE YOU EVER DONE ANYTHING, STARTED TO DO ANYTHING, OR PREPARED TO DO ANYTHING TO END YOUR LIFE?: NO
2. HAVE YOU ACTUALLY HAD ANY THOUGHTS OF KILLING YOURSELF?: NO
1. IN THE PAST MONTH, HAVE YOU WISHED YOU WERE DEAD OR WISHED YOU COULD GO TO SLEEP AND NOT WAKE UP?: NO

## 2025-03-17 ASSESSMENT — PAIN DESCRIPTION - LOCATION: LOCATION: HEAD

## 2025-03-17 NOTE — H&P
History Of Present Illness  Nicolle Campbell is a 69 y.o. female  With a past medical history of asthma, pneumonia, right lung collapse, seasonal allergies who [place] [facility] [place].  Patient reports she went to see her pulmonologist and was given doxycycline for pneumonia which she completed.  She states she started feeling worse on Friday complaining of fatigue, decreased appetite, and shortness of breath which began around 4 AM this morning.  She denies any aggravating or alleviating factors.  She reports that she feels shortness of breath at rest and with exertion.  She is reporting a productive cough with yellow sputum.  She denies any fevers or chills.  She is reporting chest pain which worsens with cough and palpation.  No reports of nausea vomiting.    ED course: Afebrile, hemodynamically stable, tachypneic with respiratory rate 21-36.  Labs notable for glucose 110, sodium 135.  Troponin negative x 2.  D-dimer 720, platelet 452, eosinophils 1.23.  Flu A/COVID-negative.  CTA chest negative for PE.  Revealing endobronchial hypodensity with occlusion of the right middle lobe bronchus and occlusion of the posterior segmental branch of the right lower lobe bronchus.  Full impression to follow.  Cefepime, azithromycin, vancomycin, Solu-Medrol, Benadryl in ER.    10 point review of systems has been performed and is negative except what is stated above    Patient has been consult placed to pulmonology for further medical management      Past Medical History  Past Medical History:   Diagnosis Date    Acute sinusitis, unspecified 07/19/2018    Acute sinusitis    Acute upper respiratory infection, unspecified 07/19/2018    URI (upper respiratory infection)    Concussion without loss of consciousness, initial encounter 07/19/2018    Concussion w/o coma    Dizziness and giddiness 07/19/2018    Vertigo    Encounter for immunization 12/20/2022    Need for vaccination    Epistaxis 07/19/2018     Epistaxis    Frequency of micturition 07/19/2018    Urinary frequency    Headache 07/19/2018    Cephalgia    Laceration without foreign body, right lower leg, initial encounter 07/19/2018    Laceration of right lower extremity, initial encounter    Moderate persistent asthma, uncomplicated ([facility]) 07/19/2018    Asthma, moderate persistent    Other allergy status, other than to drugs and biological substances     Environmental allergies    Other malaise 07/19/2018    Malaise and fatigue    Personal history of other diseases of the respiratory system 02/01/2018    History of upper respiratory infection    Personal history of other diseases of the respiratory system 08/16/2021    History of acute sinusitis    Personal history of other diseases of the respiratory system 07/09/2015    History of asthma    Personal history of other specified conditions 07/09/2015    History of abnormal Pap smear    Unspecified fall, initial encounter 06/07/2016    Fall    Unspecified hearing loss, bilateral 07/19/2018    Hearing loss, bilateral    Unspecified otitis externa, left ear 03/19/2021    Left otitis externa    Urinary tract infection, site not specified 07/19/2018    UTI (urinary tract infection)       Surgical History  Past Surgical History:   Procedure Laterality Date    OTHER SURGICAL HISTORY  06/29/2021    Cataract surgery        Social History  She reports that she has never smoked. She has never used smokeless tobacco. She reports that she does not drink alcohol and does not use drugs.    Family History  Family History   Problem Relation Name Age of Onset    No Known Problems Mother      Diabetes Father      Diabetes Daughter          Allergies  Bee pollen, Corn, Penicillin, Prednisone, and Wheat       Physical Exam  Constitutional:       Appearance: She is normal weight.   HENT:      Nose: Nose normal.      Mouth/Throat:      Mouth: Mucous membranes are moist.      Pharynx: Oropharynx is clear.   Eyes:       "Conjunctiva/sclera: Conjunctivae normal.   Cardiovascular:      Rate and Rhythm: Normal rate and regular rhythm.      Heart sounds: Normal heart sounds.   Pulmonary:      Effort: Pulmonary effort is normal.      Comments: Diminished bilateral bases  Abdominal:      General: Abdomen is flat.      Palpations: Abdomen is soft.   Musculoskeletal:         General: Normal range of motion.      Cervical back: Normal range of motion and neck supple.   Skin:     General: Skin is warm.   Neurological:      General: No focal deficit present.      Mental Status: She is alert.          Last Recorded Vitals  Blood pressure 109/58, pulse 91, temperature 36.8 °C (98.2 °F), temperature source Temporal, resp. rate (!) 30, height 1.676 m (5' 6\"), weight 49.9 kg (110 lb), SpO2 98%.    Scheduled medications  azithromycin, 500 mg, intravenous, q24h  cefTRIAXone, 2 g, intravenous, q24h  enoxaparin, 40 mg, subcutaneous, Daily  fluticasone furoate-vilanteroL, 1 puff, inhalation, Daily  ipratropium-albuteroL, 3 mL, nebulization, TID  methylPREDNISolone sodium succinate (PF), 40 mg, intravenous, q24h  montelukast, 10 mg, oral, Nightly      Continuous medications     PRN medications  PRN medications: acetaminophen, albuterol, benzonatate, guaiFENesin, oxygen  ECG 12 lead    Result Date: 3/17/2025  Sinus rhythm Atrial premature complex    CT angio chest for pulmonary embolism    Result Date: 3/16/2025  STUDY: CT Angiogram of the Chest; 3/16/2025 3:41 PM INDICATION: Chest pain. COMPARISON: CT chest 3/16/2025. ACCESSION NUMBER(S): [id number] ORDERING CLINICIAN: KALA KELLY TECHNIQUE:  CTA of the chest was performed with intravenous contrast. Images are reviewed and processed at a workstation according to the CT angiogram protocol with 3-D and/or MIP post processing imaging generated.  Omnipaque 350 75 mL was administered intravenously. Automated mA/kV exposure control was utilized and patient examination was performed in strict accordance " with principles of ALARA. FINDINGS: No central filling defect is seen within the main, lobar or proximal segmental pulmonary arteries to suggest an acute pulmonary embolism.. No thoracic aortic aneurysm, dissection or periaortic inflammation is seen.. Heart remains normal in size without pericardial or significant pleural effusion..  Multiple prominent mediastinal and right hilar lymph nodes are seen, possibly reactive however remain nonspecific and amenable to follow-up. There is no pleural effusion, pleural thickening, or pneumothorax. The trachea and right and left mainstem bronchi are patent. There is endobronchial hypodensity with occlusion of the right middle lobe bronchus and occlusion of the posterior segmental branch of the right lower lobe bronchus, series 506 image 154 and 176.. Again seen is consolidative airspace opacity in the right middle lobe with air bronchograms, not significantly changed in the interval since the recent comparison examination. Atelectasis and small amount of superposed infiltrate also seen at the right lung base, stable from the comparison examination. Stable biapical fibrosis is again seen.. A 6 mm enhancing focus is seen in the right posterior hepatic segment, series 501 image 284, possibly a flash filling hemangioma however remains nonspecific on a single phase CT. There are no acute fractures.  No suspicious bony lesions.    1. No central pulmonary embolism, aortic aneurysm or dissection. 2. Endobronchial hypodensity with occlusion of the right middle lobe bronchus and occlusion of the posterior segmental branch of the right lower lobe bronchus. Again seen is consolidative airspace opacity in the right middle lobe with air bronchograms, not significantly changed in the interval since the recent comparison examination. Follow-up can document resolution to exclude any underlying pathology. 3. Prominent mediastinal and right hilar lymph nodes, possibly reactive however remain  nonspecific and amenable to follow-up. Signed by Foster Watson MD    CT chest wo IV contrast    Result Date: 3/16/2025  STUDY: CT Chest without IV Contrast; 03/16/2025, 2:31 PM INDICATION: Cough. COMPARISON: CT chest 01/22/2025,11/30/2024. ACCESSION NUMBER(S): [id number] ORDERING CLINICIAN: KALA KELLY TECHNIQUE:  CT of the chest was performed without contrast. Automated mA/kV exposure control was utilized and patient examination was performed in strict accordance with principles of ALARA. FINDINGS: MEDIASTINUM: The heart is normal in size without pericardial effusion.  Coronary artery calcifications are identified.  LUNGS/PLEURA: There is no pleural effusion, pleural thickening, or pneumothorax. The airways are patent. There is a dense consolidating infiltrate in the right middle lobe. There is a small infiltrate posteriorly in the right lower lobe. LYMPH NODES: Thoracic lymph nodes are not enlarged. UPPER ABDOMEN: Upper abdomen demonstrates no acute pathology. BONES: There are no acute fractures.  No suspicious bony lesions.    Consolidating infiltrate in the right middle lobe with small posterior right lower lobe infiltrate.  These findings have increased from January 2025. Signed by Gerald Neely MD    XR chest 1 view    Result Date: 3/16/2025  STUDY: Chest Radiograph; 03/16/2025 9:54 AM INDICATION: Chest pain. COMPARISON: XR chest 11/30/2024, 12/06/2023. ACCESSION NUMBER(S): [id number] ORDERING CLINICIAN: KALA KELLY TECHNIQUE:  Frontal chest was obtained at 09:54:00 hours. FINDINGS: CARDIOMEDIASTINAL SILHOUETTE: Cardiomediastinal silhouette is normal in size and configuration.  LUNGS: Patchy consolidation in the right base.  Underlying emphysema.  ABDOMEN: No remarkable upper abdominal findings.  BONES: No acute osseous changes.    Patchy consolidation in the right base. Correlate clinically for pneumonia. Signed by Reilly Lama MD   Results for orders placed or performed during the hospital  encounter of 03/16/25 (from the past 24 hours)   MRSA Surveillance for Vancomycin De-escalation, PCR    Specimen: Anterior Nares; Swab   Result Value Ref Range    MRSA PCR Not Detected Not Detected   Legionella Antigen, Urine    Specimen: Clean Catch/Voided; Urine   Result Value Ref Range    L. pneumophila Urine Ag Negative Negative   Streptococcus pneumoniae Antigen, Urine    Specimen: Clean Catch/Voided; Urine   Result Value Ref Range    Streptococcus pneumoniae Ag, Urine Negative Negative   CBC   Result Value Ref Range    WBC 12.0 (H) 4.4 - 11.3 x10*3/uL    nRBC 0.0 0.0 - 0.0 /100 WBCs    RBC 4.32 4.00 - 5.20 x10*6/uL    Hemoglobin 11.5 (L) 12.0 - 16.0 g/dL    Hematocrit 35.5 (L) 36.0 - 46.0 %    MCV 82 80 - 100 fL    MCH 26.6 26.0 - 34.0 pg    MCHC 32.4 32.0 - 36.0 g/dL    RDW 14.0 11.5 - 14.5 %    Platelets 443 150 - 450 x10*3/uL   Basic Metabolic Panel   Result Value Ref Range    Glucose 135 (H) 74 - 99 mg/dL    Sodium 135 (L) 136 - 145 mmol/L    Potassium 3.8 3.5 - 5.3 mmol/L    Chloride 104 98 - 107 mmol/L    Bicarbonate 22 21 - 32 mmol/L    Anion Gap 13 10 - 20 mmol/L    Urea Nitrogen 19 6 - 23 mg/dL    Creatinine 0.85 0.50 - 1.05 mg/dL    eGFR 74 >60 mL/min/1.73m*2    Calcium 9.2 8.6 - 10.3 mg/dL                  Assessment/Plan   Assessment & Plan  Dyspnea    Pneumonia    Pneumonia due to infectious organism, unspecified laterality, unspecified part of lung      Nicolle Campbell is a 69 y.o. female  With a past medical history of asthma, pneumonia, right lung collapse, seasonal allergies who [place] [facility] [place] breath.  Patient reports she went to see her pulmonologist and was given doxycycline for pneumonia which she completed.  She states she started feeling worse on Friday complaining of fatigue, decreased appetite, and shortness of breath which began around 4 AM this morning.  She denies any aggravating or alleviating factors.  She reports that she feels shortness of breath  at rest and with exertion.  She is reporting a productive cough with yellow sputum.  She denies any fevers or chills.  She is reporting chest pain which worsens with cough and palpation.  No reports of nausea vomiting.    #Pneumonia  #Shortness of breath  #History asthma  Admit under the care of Dr. Teixeira  Consult pulmonology, appreciate input  cefepime, azithromycin, vancomycin in ER-continue azithromycin/rocephin-defer vanco to attending/pulmonology   Continue Solu-Medrol  Continue home inhalers  Urine strep/Legionella  Sputum culture  Supplemental O2 as needed  mucinex    #DVT ppx  Lovenox  SCD     Chief Complaint   Patient presents with    Chest Pain    Shortness of Breath     Patient arrives from home with complaints of chest pressure and tightness that has been ongoing for the past 2 days.  Patient also notes SOB and has a history of asthma.  Patient states she has been coughing constantly for the past 3 days.  Patient also states she was diagnosed with a collapsed lung back in November.        76-year-old female presenting with chest pain which is right-sided.  Significant past medical history for asthma, lung collapse right middle lobe.  Patient states that she was diagnosed with right middle lobe collapse in November.  She is been following with the pulmonologist at [facility].  Patient was on 2 weeks of Levaquin and then 10 days of doxycycline.  She has been off of these antibiotics for couple weeks now.  Patient states that she is getting worsening pain in the right side of her chest and she feels like she cannot catch her breath.  She has been using her breathing treatments without success.  She denies fevers or chills but she does feel like she is weak.  She states he is coughing a lot more.                  had concerns including Chest Pain and Shortness of Breath (Patient arrives from home with complaints of chest pressure and tightness that has been ongoing for the past 2 days.  Patient  also notes SOB and has a history of asthma.  Patient states she has been coughing constantly for the past 3 days.  Patient also states she was diagnosed with a collapsed lung back in November. ).       Problem List Items Addressed This Visit       Dyspnea    * (Principal) Pneumonia due to infectious organism, unspecified laterality, unspecified part of lung - Primary     Other Visit Diagnoses       Failure of outpatient treatment        Chest pain, unspecified type                HPI       Shortness of Breath     Additional comments: Patient arrives from home with complaints of chest pressure and tightness that has been ongoing for the past 2 days.  Patient also notes SOB and has a history of asthma.  Patient states she has been coughing constantly for the past 3 days.  Patient also states she was diagnosed with a collapsed lung back in November.           Last edited by Сергей Spivey RN on 3/16/2025  9:08 AM.          Problem List as of 3/17/2025 Reviewed: 1/28/2025  2:23 PM by Jacoby Conklin DO      AB (asthmatic bronchitis) ([facility])    Acute URI    Asthma, moderate persistent ([facility])    Last Assessment & Plan 1/7/2025 Office Visit Written 1/7/2025  3:59 PM by Jacoby Conklin DO       Orders:    Urinalysis with Reflex Microscopic; Future    CBC and Auto Differential; Future           Astigmatism, bilateral    Bilateral presbyopia    Cephalgia    Concussion w/o coma    Cortical age-related cataract of both eyes    Epistaxis    Fall    Genitourinary syndrome of menopause    Hearing impairment    Hearing loss, bilateral    Hypermetropia of both eyes    Laceration of right lower extremity    Left otitis externa    Malaise and fatigue    Mixed incontinence urge and stress    Nuclear sclerosis of both eyes    Pelvic pain in female    PVD (posterior vitreous detachment), both eyes    Tooth caries    Urinary frequency    UTI (urinary tract infection)    UTI symptoms    Vaginal prolapse    Vertigo    Pneumonia  of right middle lobe due to infectious organism    Collapse of right lung    Last Assessment & Plan 1/7/2025 Office Visit Written 1/7/2025  3:59 PM by Jacoby Conklin DO       Orders:    Urinalysis with Reflex Microscopic; Future    Comprehensive Metabolic Panel; Future    CBC and Auto Differential; Future           Dyspnea    Pneumonia    * (Principal) Pneumonia due to infectious organism, unspecified laterality, unspecified part of lung       Active Ambulatory Problems     Diagnosis Date Noted    AB (asthmatic bronchitis) ([facility]) 07/13/2023    Acute URI 07/13/2023    Asthma, moderate persistent ([facility]) 07/13/2023    Astigmatism, bilateral 07/13/2023    Bilateral presbyopia 07/13/2023    Cephalgia 07/13/2023    Concussion w/o coma 07/13/2023    Cortical age-related cataract of both eyes 07/13/2023    Epistaxis 07/13/2023    Fall 07/13/2023    Genitourinary syndrome of menopause 07/13/2023    Hearing impairment 07/13/2023    Hearing loss, bilateral 07/13/2023    Hypermetropia of both eyes 07/13/2023    Laceration of right lower extremity 07/13/2023    Left otitis externa 07/13/2023    Malaise and fatigue 07/13/2023    Mixed incontinence urge and stress 07/13/2023    Nuclear sclerosis of both eyes 07/13/2023    Pelvic pain in female 07/13/2023    PVD (posterior vitreous detachment), both eyes 07/13/2023    Tooth caries 07/13/2023    Urinary frequency 07/13/2023    UTI (urinary tract infection) 07/13/2023    UTI symptoms 07/13/2023    Vaginal prolapse 07/13/2023    Vertigo 07/13/2023    Pneumonia of right middle lobe due to infectious organism 12/05/2024    Collapse of right lung 12/05/2024     Resolved Ambulatory Problems     Diagnosis Date Noted    No Resolved Ambulatory Problems     Past Medical History:   Diagnosis Date    Acute sinusitis, unspecified 07/19/2018    Acute upper respiratory infection, unspecified 07/19/2018    Concussion without loss of consciousness, initial encounter 07/19/2018     Dizziness and giddiness 07/19/2018    Encounter for immunization 12/20/2022    Frequency of micturition 07/19/2018    Headache 07/19/2018    Laceration without foreign body, right lower leg, initial encounter 07/19/2018    Moderate persistent asthma, uncomplicated ([facility]) 07/19/2018    Other allergy status, other than to drugs and biological substances     Other malaise 07/19/2018    Personal history of other diseases of the respiratory system 02/01/2018    Personal history of other diseases of the respiratory system 08/16/2021    Personal history of other diseases of the respiratory system 07/09/2015    Personal history of other specified conditions 07/09/2015    Unspecified fall, initial encounter 06/07/2016    Unspecified hearing loss, bilateral 07/19/2018    Unspecified otitis externa, left ear 03/19/2021    Urinary tract infection, site not specified 07/19/2018       Thorough record review performed of previous labs and notes from prior encounters.     Patient fully evaluated  03/17        Active Orders   Microbiology    Respiratory Culture/Smear     Frequency: Once     Number of Occurrences: 1 Occurrences     Order Comments: Sterile Container-Ambient TemperatureSpecial Instructions: Bacterial culture with Gram stain smear. Preferably, collect specimen before antibiotics are administered.       Nursing    Apply sequential compression device     Frequency: Until discontinued     Number of Occurrences: Until Specified    Height on admission     Frequency: Once     Number of Occurrences: 1 Occurrences    Notify provider     Frequency: Until discontinued     Number of Occurrences: Until Specified    Obtain cultures before antibiotics are started. Administer antimicrobial therapy within 4 hours of presentation.     Frequency: Until discontinued     Number of Occurrences: Until Specified    Vital Signs     Frequency: q4h     Number of Occurrences: Until Specified    Weight on admission     Frequency: Once      Number of Occurrences: 1 Occurrences   Consult    Inpatient consult to Dietitian     Frequency: Once     Number of Occurrences: 1 Occurrences     Order Comments: Nursing Admission Screening      Inpatient consult to Pulmonology     Frequency: Once     Number of Occurrences: 1 Occurrences   Nourishments    May Participate in Room Service     Frequency: Once     Number of Occurrences: 1 Occurrences   Respiratory Care    Respiratory care eval and treat     Frequency: Once     Number of Occurrences: 1 Occurrences   Admission    Admit to inpatient     Frequency: Once     Number of Occurrences: 1 Occurrences   Telemetry    Telemetry monitoring     Frequency: Until discontinued     Number of Occurrences: 3 Days   Medications    acetaminophen (Tylenol) tablet 650 mg     Frequency: q4h PRN     Dose: 650 mg     Route: oral    albuterol 2.5 mg /3 mL (0.083 %) nebulizer solution 3 mL     Frequency: q2h PRN     Dose: 3 mL     Route: nebulization    azithromycin (Zithromax) 500 mg in dextrose 5%  mL     Frequency: q24h     Dose: 500 mg     Route: intravenous    benzonatate (Tessalon) capsule 100 mg     Frequency: TID PRN     Dose: 100 mg     Route: oral    cefTRIAXone (Rocephin) 2 g in dextrose (iso) IV 50 mL     Frequency: q24h     Dose: 2 g     Route: intravenous    enoxaparin (Lovenox) syringe 40 mg     Frequency: Daily     Dose: 40 mg     Route: subcutaneous    fluticasone furoate-vilanteroL (Breo Ellipta) 200-25 mcg/dose inhaler 1 puff     Frequency: Daily     Dose: 1 puff     Route: inhalation    guaiFENesin (Mucinex) 12 hr tablet 600 mg     Frequency: BID PRN     Dose: 600 mg     Route: oral    ipratropium-albuteroL (Duo-Neb) 0.5-2.5 mg/3 mL nebulizer solution 3 mL     Frequency: TID     Dose: 3 mL     Route: nebulization    methylPREDNISolone sod succinate (SOLU-Medrol) 40 mg/mL injection 40 mg     Frequency: q24h     Dose: 40 mg     Route: intravenous    montelukast (Singulair) tablet 10 mg     Frequency: Nightly  "    Dose: 10 mg     Route: oral    oxygen (O2) therapy     Frequency: Continuous PRN - O2/gases     Route: inhalation     Dietary Orders (From admission, onward)       Start     Ordered    03/17/25 1329  May Participate in Room Service  ( ROOM SERVICE MAY PARTICIPATE)  Once        Question:  .  Answer:  Yes    03/17/25 1328                  69 yrs@  ADMITDTTM@  Pneumonia due to infectious organism, unspecified laterality, unspecified part of lung [J18.9]  [unfilled]  Weight: 49.9 kg (110 lb)     Vitals:    03/16/25 0908 03/16/25 1000 03/16/25 1100 03/16/25 1130   BP: 126/61 133/67 135/70 133/65   Pulse: 84 78 75 78   Resp: (!) 24 (!) 36 (!) 21 (!) 22   Temp: 36.8 °C (98.2 °F)      TempSrc: Temporal      SpO2: 95% 97% 97% 96%   Weight: 49.9 kg (110 lb)      Height: 1.676 m (5' 6\")       03/16/25 1200 03/16/25 1300 03/16/25 1400 03/16/25 1500   BP: 130/64 134/70 144/65 142/68   Pulse: 76 92 94 97   Resp: (!) 23 (!) 22 (!) 26 (!) 21   Temp:       TempSrc:       SpO2: 100% 94% 94% 94%   Weight:       Height:        03/16/25 2000 03/16/25 2100 03/16/25 2125 03/16/25 2200   BP:  154/62 117/60 114/61   Pulse: 98 93 100 99   Resp: (!) 26 (!) 22 20 (!) 26   Temp:       TempSrc:       SpO2: 95% 94% 95% (!) 93%   Weight:       Height:        03/16/25 2259 03/16/25 2300 03/17/25 0000 03/17/25 0015   BP: 118/58 118/58 116/55    Pulse: 93 91 (!) 103 (!) 109   Resp: (!) 21 (!) 21 (!) 23 (!) 31   Temp:       TempSrc:       SpO2: 94% 94% (!) 93% 96%   Weight:       Height:        03/17/25 0030 03/17/25 0045 03/17/25 0100 03/17/25 0115   BP:   118/55    Pulse: (!) 102 (!) 101 98 96   Resp: (!) 27 (!) 21 (!) 21 (!) 22   Temp:       TempSrc:       SpO2: 94% 94% (!) 93% 94%   Weight:       Height:        03/17/25 0130 03/17/25 0145 03/17/25 0200 03/17/25 0215   BP:   110/57    Pulse: 96 90 90 (!) 102   Resp: (!) 21 20 (!) 21 (!) 23   Temp:       TempSrc:       SpO2: 95% 94% 94% 96%   Weight:       Height:        03/17/25 0230 03/17/25 " 0245 03/17/25 0300 03/17/25 0315   BP:   128/69    Pulse: 90 87 91 88   Resp: 19 20 17 18   Temp:       TempSrc:       SpO2: (!) 93% 94% (!) 93% 95%   Weight:       Height:        03/17/25 0330 03/17/25 0345 03/17/25 0400 03/17/25 0415   BP:   126/78    Pulse: 88 87 87 88   Resp: 19 18 19 19   Temp:       TempSrc:       SpO2: 94% 95% 94% 95%   Weight:       Height:        03/17/25 0430 03/17/25 0445 03/17/25 0500 03/17/25 0558   BP:   105/51 110/61   Pulse: 85 91 88 85   Resp: 19 (!) 22 (!) 22 (!) 22   Temp:       TempSrc:       SpO2: 96% 95% 95% 97%   Weight:       Height:        03/17/25 0600 03/17/25 0700 03/17/25 0734 03/17/25 0800   BP: 106/55 109/63  106/56   Pulse: 83 81  93   Resp: (!) 21 (!) 21  (!) 24   Temp:       TempSrc:       SpO2: 97% 97% 98% 97%   Weight:       Height:        03/17/25 0858 03/17/25 1000 03/17/25 1100 03/17/25 1200   BP:  115/69 95/52 99/57   Pulse:  (!) 113 85 84   Resp:  (!) 39  (!) 29   Temp:       TempSrc:       SpO2: 98% 94% 99% 98%   Weight:       Height:        03/17/25 1400   BP: 109/58   Pulse: 91   Resp: (!) 30   Temp:    TempSrc:    SpO2: 98%   Weight:    Height:             Chief Complaint    Chest Pain; Shortness of Breath         Patient seen resting in bed with head of bed elevated, no s/s or c/o acute difficulties at this time.  Vital signs for last 24 hours Heart Rate:  [] 91  Respirations:  [17-39] 30  BP: ()/(51-78) 109/58    No intake/output data recorded.  Patient still requiring frequent cardiac and SPO2 monitoring. Continue aggressive pulmonary hygiene and oral hygiene. Off loading as tolerated for skin integrity. Medications and labs reviewed-    Patient recently received an antibiotic (last 12 hours)       None            Results for orders placed or performed during the hospital encounter of 03/16/25 (from the past 96 hours)   CBC and Auto Differential   Result Value Ref Range    WBC 8.6 4.4 - 11.3 x10*3/uL    nRBC 0.0 0.0 - 0.0 /100 WBCs    RBC  4.55 4.00 - 5.20 x10*6/uL    Hemoglobin 12.3 12.0 - 16.0 g/dL    Hematocrit 38.2 36.0 - 46.0 %    MCV 84 80 - 100 fL    MCH 27.0 26.0 - 34.0 pg    MCHC 32.2 32.0 - 36.0 g/dL    RDW 13.8 11.5 - 14.5 %    Platelets 452 (H) 150 - 450 x10*3/uL    Neutrophils % 60.5 40.0 - 80.0 %    Immature Granulocytes %, Automated 0.1 0.0 - 0.9 %    Lymphocytes % 18.1 13.0 - 44.0 %    Monocytes % 6.5 2.0 - 10.0 %    Eosinophils % 14.3 0.0 - 6.0 %    Basophils % 0.5 0.0 - 2.0 %    Neutrophils Absolute 5.20 1.20 - 7.70 x10*3/uL    Immature Granulocytes Absolute, Automated 0.01 0.00 - 0.70 x10*3/uL    Lymphocytes Absolute 1.56 1.20 - 4.80 x10*3/uL    Monocytes Absolute 0.56 0.10 - 1.00 x10*3/uL    Eosinophils Absolute 1.23 (H) 0.00 - 0.70 x10*3/uL    Basophils Absolute 0.04 0.00 - 0.10 x10*3/uL   Comprehensive metabolic panel   Result Value Ref Range    Glucose 110 (H) 74 - 99 mg/dL    Sodium 135 (L) 136 - 145 mmol/L    Potassium 4.9 3.5 - 5.3 mmol/L    Chloride 102 98 - 107 mmol/L    Bicarbonate 27 21 - 32 mmol/L    Anion Gap 11 10 - 20 mmol/L    Urea Nitrogen 13 6 - 23 mg/dL    Creatinine 0.82 0.50 - 1.05 mg/dL    eGFR 77 >60 mL/min/1.73m*2    Calcium 9.2 8.6 - 10.3 mg/dL    Albumin 4.1 3.4 - 5.0 g/dL    Alkaline Phosphatase 104 33 - 136 U/L    Total Protein 7.5 6.4 - 8.2 g/dL    AST 24 9 - 39 U/L    Bilirubin, Total 0.4 0.0 - 1.2 mg/dL    ALT 20 7 - 45 U/L   B-Type Natriuretic Peptide   Result Value Ref Range    BNP 36 0 - 99 pg/mL   D-Dimer, VTE Exclusion   Result Value Ref Range    D-Dimer, Quantitative VTE Exclusion 720 (H) <=500 ng/mL FEU   Sars-CoV-2 and Influenza A/B PCR   Result Value Ref Range    Flu A Result Not Detected Not Detected    Flu B Result Not Detected Not Detected    Coronavirus 2019, PCR Not Detected Not Detected   RSV PCR   Result Value Ref Range    RSV PCR Not Detected Not Detected   Troponin I, High Sensitivity, Initial   Result Value Ref Range    Troponin I, High Sensitivity 7 0 - 13 ng/L   ECG 12 lead    Result Value Ref Range    Ventricular Rate 95 BPM    Atrial Rate 95 BPM    IN Interval 137 ms    QRS Duration 98 ms    QT Interval 362 ms    QTC Calculation(Bazett) 455 ms    P Axis 69 degrees    R Axis 53 degrees    T Axis 53 degrees    QRS Count 16 beats    Q Onset 254 ms    T Offset 435 ms    QTC Fredericia 421 ms   Troponin, High Sensitivity, 1 Hour   Result Value Ref Range    Troponin I, High Sensitivity 7 0 - 13 ng/L   MRSA Surveillance for Vancomycin De-escalation, PCR    Specimen: Anterior Nares; Swab   Result Value Ref Range    MRSA PCR Not Detected Not Detected   Legionella Antigen, Urine    Specimen: Clean Catch/Voided; Urine   Result Value Ref Range    L. pneumophila Urine Ag Negative Negative   Streptococcus pneumoniae Antigen, Urine    Specimen: Clean Catch/Voided; Urine   Result Value Ref Range    Streptococcus pneumoniae Ag, Urine Negative Negative   CBC   Result Value Ref Range    WBC 12.0 (H) 4.4 - 11.3 x10*3/uL    nRBC 0.0 0.0 - 0.0 /100 WBCs    RBC 4.32 4.00 - 5.20 x10*6/uL    Hemoglobin 11.5 (L) 12.0 - 16.0 g/dL    Hematocrit 35.5 (L) 36.0 - 46.0 %    MCV 82 80 - 100 fL    MCH 26.6 26.0 - 34.0 pg    MCHC 32.4 32.0 - 36.0 g/dL    RDW 14.0 11.5 - 14.5 %    Platelets 443 150 - 450 x10*3/uL   Basic Metabolic Panel   Result Value Ref Range    Glucose 135 (H) 74 - 99 mg/dL    Sodium 135 (L) 136 - 145 mmol/L    Potassium 3.8 3.5 - 5.3 mmol/L    Chloride 104 98 - 107 mmol/L    Bicarbonate 22 21 - 32 mmol/L    Anion Gap 13 10 - 20 mmol/L    Urea Nitrogen 19 6 - 23 mg/dL    Creatinine 0.85 0.50 - 1.05 mg/dL    eGFR 74 >60 mL/min/1.73m*2    Calcium 9.2 8.6 - 10.3 mg/dL     Plan discussed with interdisciplinary team, consults placed, appreciate input. Will continue current and repeat labs in the AM.      Discharge planning discussed with patient and care team. Therapy evaluations ordered. Patient aware and agreeable to current plan, continue plan as above.     I spent a total of 75 minutes on the date of  the service which included preparing to see the patient, face-to-face patient care, completing clinical documentation, obtaining and/or reviewing separately obtained history, performing a medically appropriate examination, counseling and educating the patient/family/caregiver, ordering medications, tests, or procedures, communicating with other HCPs (not separately reported), independently interpreting results (not separately reported), communicating results to the patient/family/caregiver, and care coordination (not separately reported).       Tahira Bacon

## 2025-03-18 LAB
ALBUMIN SERPL BCP-MCNC: 3.9 G/DL (ref 3.4–5)
ALP SERPL-CCNC: 87 U/L (ref 33–136)
ALT SERPL W P-5'-P-CCNC: 18 U/L (ref 7–45)
ANION GAP SERPL CALC-SCNC: 13 MMOL/L (ref 10–20)
AST SERPL W P-5'-P-CCNC: 21 U/L (ref 9–39)
BASOPHILS # BLD AUTO: 0.02 X10*3/UL (ref 0–0.1)
BASOPHILS NFR BLD AUTO: 0.2 %
BILIRUB SERPL-MCNC: 0.3 MG/DL (ref 0–1.2)
BUN SERPL-MCNC: 22 MG/DL (ref 6–23)
CALCIUM SERPL-MCNC: 9.2 MG/DL (ref 8.6–10.3)
CHLORIDE SERPL-SCNC: 104 MMOL/L (ref 98–107)
CO2 SERPL-SCNC: 23 MMOL/L (ref 21–32)
CREAT SERPL-MCNC: 0.87 MG/DL (ref 0.5–1.05)
EGFRCR SERPLBLD CKD-EPI 2021: 72 ML/MIN/1.73M*2
EOSINOPHIL # BLD AUTO: 0.04 X10*3/UL (ref 0–0.7)
EOSINOPHIL NFR BLD AUTO: 0.3 %
ERYTHROCYTE [DISTWIDTH] IN BLOOD BY AUTOMATED COUNT: 14.3 % (ref 11.5–14.5)
GLUCOSE SERPL-MCNC: 157 MG/DL (ref 74–99)
HCT VFR BLD AUTO: 38.7 % (ref 36–46)
HGB BLD-MCNC: 12.1 G/DL (ref 12–16)
IMM GRANULOCYTES # BLD AUTO: 0.06 X10*3/UL (ref 0–0.7)
IMM GRANULOCYTES NFR BLD AUTO: 0.5 % (ref 0–0.9)
LYMPHOCYTES # BLD AUTO: 0.68 X10*3/UL (ref 1.2–4.8)
LYMPHOCYTES NFR BLD AUTO: 5.7 %
MCH RBC QN AUTO: 26.5 PG (ref 26–34)
MCHC RBC AUTO-ENTMCNC: 31.3 G/DL (ref 32–36)
MCV RBC AUTO: 85 FL (ref 80–100)
MONOCYTES # BLD AUTO: 0.08 X10*3/UL (ref 0.1–1)
MONOCYTES NFR BLD AUTO: 0.7 %
NEUTROPHILS # BLD AUTO: 10.97 X10*3/UL (ref 1.2–7.7)
NEUTROPHILS NFR BLD AUTO: 92.6 %
NRBC BLD-RTO: 0 /100 WBCS (ref 0–0)
PLATELET # BLD AUTO: 462 X10*3/UL (ref 150–450)
POTASSIUM SERPL-SCNC: 4.6 MMOL/L (ref 3.5–5.3)
PROT SERPL-MCNC: 7.1 G/DL (ref 6.4–8.2)
RBC # BLD AUTO: 4.56 X10*6/UL (ref 4–5.2)
SODIUM SERPL-SCNC: 135 MMOL/L (ref 136–145)
WBC # BLD AUTO: 11.9 X10*3/UL (ref 4.4–11.3)

## 2025-03-18 PROCEDURE — 1200000002 HC GENERAL ROOM WITH TELEMETRY DAILY

## 2025-03-18 PROCEDURE — 2500000001 HC RX 250 WO HCPCS SELF ADMINISTERED DRUGS (ALT 637 FOR MEDICARE OP)

## 2025-03-18 PROCEDURE — 94640 AIRWAY INHALATION TREATMENT: CPT

## 2025-03-18 PROCEDURE — 2500000004 HC RX 250 GENERAL PHARMACY W/ HCPCS (ALT 636 FOR OP/ED): Performed by: NURSE PRACTITIONER

## 2025-03-18 PROCEDURE — 2500000001 HC RX 250 WO HCPCS SELF ADMINISTERED DRUGS (ALT 637 FOR MEDICARE OP): Performed by: NURSE PRACTITIONER

## 2025-03-18 PROCEDURE — 2500000005 HC RX 250 GENERAL PHARMACY W/O HCPCS: Performed by: NURSE PRACTITIONER

## 2025-03-18 PROCEDURE — 36415 COLL VENOUS BLD VENIPUNCTURE: CPT | Performed by: INTERNAL MEDICINE

## 2025-03-18 PROCEDURE — 2500000002 HC RX 250 W HCPCS SELF ADMINISTERED DRUGS (ALT 637 FOR MEDICARE OP, ALT 636 FOR OP/ED): Performed by: EMERGENCY MEDICINE

## 2025-03-18 PROCEDURE — 85025 COMPLETE CBC W/AUTO DIFF WBC: CPT | Performed by: INTERNAL MEDICINE

## 2025-03-18 PROCEDURE — 80053 COMPREHEN METABOLIC PANEL: CPT | Performed by: INTERNAL MEDICINE

## 2025-03-18 RX ADMIN — BENZONATATE 100 MG: 100 CAPSULE ORAL at 04:42

## 2025-03-18 RX ADMIN — BENZONATATE 100 MG: 100 CAPSULE ORAL at 10:26

## 2025-03-18 RX ADMIN — MONTELUKAST 10 MG: 10 TABLET, FILM COATED ORAL at 20:40

## 2025-03-18 RX ADMIN — GUAIFENESIN 600 MG: 600 TABLET, EXTENDED RELEASE ORAL at 10:26

## 2025-03-18 RX ADMIN — IPRATROPIUM BROMIDE AND ALBUTEROL SULFATE 3 ML: .5; 3 SOLUTION RESPIRATORY (INHALATION) at 07:22

## 2025-03-18 RX ADMIN — Medication 21 PERCENT: at 07:25

## 2025-03-18 RX ADMIN — ACETAMINOPHEN 650 MG: 325 TABLET, FILM COATED ORAL at 10:26

## 2025-03-18 RX ADMIN — BENZONATATE 100 MG: 100 CAPSULE ORAL at 15:43

## 2025-03-18 RX ADMIN — IPRATROPIUM BROMIDE AND ALBUTEROL SULFATE 3 ML: .5; 3 SOLUTION RESPIRATORY (INHALATION) at 12:20

## 2025-03-18 RX ADMIN — GUAIFENESIN 600 MG: 600 TABLET, EXTENDED RELEASE ORAL at 20:40

## 2025-03-18 RX ADMIN — IPRATROPIUM BROMIDE AND ALBUTEROL SULFATE 3 ML: .5; 3 SOLUTION RESPIRATORY (INHALATION) at 18:22

## 2025-03-18 RX ADMIN — FLUTICASONE FUROATE AND VILANTEROL TRIFENATATE 1 PUFF: 200; 25 POWDER RESPIRATORY (INHALATION) at 07:31

## 2025-03-18 RX ADMIN — METHYLPREDNISOLONE SODIUM SUCCINATE 40 MG: 40 INJECTION, POWDER, FOR SOLUTION INTRAMUSCULAR; INTRAVENOUS at 00:41

## 2025-03-18 RX ADMIN — AZITHROMYCIN MONOHYDRATE 500 MG: 500 INJECTION, POWDER, LYOPHILIZED, FOR SOLUTION INTRAVENOUS at 15:44

## 2025-03-18 RX ADMIN — CEFTRIAXONE SODIUM 2 G: 2 INJECTION, SOLUTION INTRAVENOUS at 17:37

## 2025-03-18 ASSESSMENT — COGNITIVE AND FUNCTIONAL STATUS - GENERAL
CLIMB 3 TO 5 STEPS WITH RAILING: A LITTLE
DAILY ACTIVITIY SCORE: 22
WALKING IN HOSPITAL ROOM: A LITTLE
TOILETING: A LITTLE
STANDING UP FROM CHAIR USING ARMS: A LITTLE
MOBILITY SCORE: 20
HELP NEEDED FOR BATHING: A LITTLE
MOVING TO AND FROM BED TO CHAIR: A LITTLE

## 2025-03-18 ASSESSMENT — PAIN SCALES - WONG BAKER: WONGBAKER_NUMERICALRESPONSE: HURTS LITTLE BIT

## 2025-03-18 ASSESSMENT — PAIN SCALES - GENERAL
PAINLEVEL_OUTOF10: 3
PAINLEVEL_OUTOF10: 0 - NO PAIN
PAINLEVEL_OUTOF10: 0 - NO PAIN

## 2025-03-18 NOTE — CONSULTS
Pulmonary Critical Care note    Patient Name :Nicolle Campbell  Date of service : 03/18/25  MRN: [id number]  YOB: 1955      Interval History:    History of Present Illness:      69 y.o. female  on day  2 of admission presenting with Pneumonia due to infectious organism, unspecified laterality, unspecified part of lung.  Patient is 69 years old  female who receives her care typically has seen general show presented with increased shortness of breath, she was seen recently by her pulmonologist send she was identified to have right middle lobe infiltrate which has been waxing and waning, presented with increased shortness of breath, coughing, progressive weight loss, and the need for oxygen, her chest CT showed reemergence of right middle lobe atelectasis  Past Medical/Surgical History:    has a past medical history of Acute sinusitis, unspecified (07/19/2018), Acute upper respiratory infection, unspecified (07/19/2018), Concussion without loss of consciousness, initial encounter (07/19/2018), Dizziness and giddiness (07/19/2018), Encounter for immunization (12/20/2022), Epistaxis (07/19/2018), Frequency of micturition (07/19/2018), Headache (07/19/2018), Laceration without foreign body, right lower leg, initial encounter (07/19/2018), Moderate persistent asthma, uncomplicated ([facility]) (07/19/2018), Other allergy status, other than to drugs and biological substances, Other malaise (07/19/2018), Personal history of other diseases of the respiratory system (02/01/2018), Personal history of other diseases of the respiratory system (08/16/2021), Personal history of other diseases of the respiratory system (07/09/2015), Personal history of other specified conditions (07/09/2015), Unspecified fall, initial encounter (06/07/2016), Unspecified hearing loss, bilateral (07/19/2018), Unspecified otitis externa, left ear (03/19/2021), and Urinary tract infection, site not specified (07/19/2018).   has a past  surgical history that includes Other surgical history (06/29/2021).   reports that she has never smoked. She has never used smokeless tobacco. She reports that she does not drink alcohol and does not use drugs.  Family History:   No relevant family history has been documented for this patient.    Allergies:     Bee pollen, Corn, Penicillin, Prednisone, and Wheat      Social history:   reports that she has never smoked. She has never used smokeless tobacco. She reports that she does not drink alcohol and does not use drugs.      Review of Systems:   General: denies weight gain, denies loss of appetite, fever, chills, night sweats.  HEENT: denies headaches, dizziness, head trauma, visual changes, eye pain, tinnitus, nosebleeds, hoarseness or throat pain    Respiratory: denies chest pain, dyspnea, cough and hemoptysis  Cardiovascular: denies orthopnea, paroxysmal nocturnal dyspnea, leg swelling, and previous heart attack.    Gastrointestinal: denies pain, nausea vomiting, diarrhea, constipation, melena or bleeding.  Genitourinary: denies hematuria, frequency, urgency or dysuria  Neurology: denies syncope, seizures, paralysis, paraesthesia   Endocrine: denies polyuria, polydipsia, skin or hair changes, and heat or cold intolerance  Musculoskeletal: denies joint pain, swelling, arthritis or myalgia  Hematologic: denies bleeding, adenopathy and easy bruising  Skin: denies rashes and skin discoloration  Psychiatry: denies depression    Physical Exam:   Vital Signs:   Vitals:    03/18/25 1822   BP:    Pulse:    Resp:    Temp:    SpO2: 95%     Vitals:    03/16/25 0908   Weight: 49.9 kg (110 lb)         Input/Output:    Intake/Output Summary (Last 24 hours) at 3/18/2025 1837  Last data filed at 3/18/2025 1836  Gross per 24 hour   Intake 400 ml   Output --   Net 400 ml       Oxygen requirements:    Ventilator Information:     Oxygen Therapy/Pulse Ox  Medical Gas Therapy: None (Room air)  SpO2: 95 %  Patient Activity During  SpO2 Measurement: At rest  Temp: 36.7 °C (98.1 °F)        General appearance: Not in pain or distress, in no respiratory distress    HEENT: Atraumatic/normocephalic, EOMI, VITA, pharynx clear, moist mucosa, redness of the uvula appreciated,   Neck: Supple, no jugular venous distension, lymphadenopathy, thyromegaly or carotid bruits  Chest: Bilateral rhonchi  Cardiovascular: Normal S1, S2, regular rate and rhythm, no murmur, rub or gallop  Abdomen: Normal sounds present, soft, lax with no tenderness, no hepatosplenomegaly, and no masses  Extremities: No edema. Pulses are equally present.   Skin: intact, no rashes   Neurologic: Alert and oriented x 3, No focal deficit         Current Medications:   Scheduled medications  azithromycin, 500 mg, intravenous, q24h  cefTRIAXone, 2 g, intravenous, q24h  enoxaparin, 40 mg, subcutaneous, Daily  fluticasone furoate-vilanteroL, 1 puff, inhalation, Daily  ipratropium-albuteroL, 3 mL, nebulization, TID  methylPREDNISolone sodium succinate (PF), 40 mg, intravenous, q24h  montelukast, 10 mg, oral, Nightly      Continuous medications     PRN medications  PRN medications: acetaminophen, albuterol, benzonatate, guaiFENesin, oxygen      Investigations:  Labs, radiological imaging and cardiac work up were personally reviewed    Results from last 7 days   Lab Units 03/18/25  0631 03/17/25  0503 03/16/25  0953   SODIUM mmol/L 135* 135* 135*   POTASSIUM mmol/L 4.6 3.8 4.9   CHLORIDE mmol/L 104 104 102   CO2 mmol/L 23 22 27   BUN mg/dL 22 19 13   CREATININE mg/dL 0.87 0.85 0.82   GLUCOSE mg/dL 157* 135* 110*   CALCIUM mg/dL 9.2 9.2 9.2     Results from last 7 days   Lab Units 03/18/25  0631   WBC AUTO x10*3/uL 11.9*   HEMOGLOBIN g/dL 12.1   HEMATOCRIT % 38.7   PLATELETS AUTO x10*3/uL 462*         ECG 12 lead    Result Date: 3/17/2025  Sinus rhythm Atrial premature complex    CT angio chest for pulmonary embolism    Result Date: 3/16/2025  STUDY: CT Angiogram of the Chest; 3/16/2025 3:41 PM  INDICATION: Chest pain. COMPARISON: CT chest 3/16/2025. ACCESSION NUMBER(S): [id number] ORDERING CLINICIAN: KALA KELLY TECHNIQUE:  CTA of the chest was performed with intravenous contrast. Images are reviewed and processed at a workstation according to the CT angiogram protocol with 3-D and/or MIP post processing imaging generated.  Omnipaque 350 75 mL was administered intravenously. Automated mA/kV exposure control was utilized and patient examination was performed in strict accordance with principles of ALARA. FINDINGS: No central filling defect is seen within the main, lobar or proximal segmental pulmonary arteries to suggest an acute pulmonary embolism.. No thoracic aortic aneurysm, dissection or periaortic inflammation is seen.. Heart remains normal in size without pericardial or significant pleural effusion..  Multiple prominent mediastinal and right hilar lymph nodes are seen, possibly reactive however remain nonspecific and amenable to follow-up. There is no pleural effusion, pleural thickening, or pneumothorax. The trachea and right and left mainstem bronchi are patent. There is endobronchial hypodensity with occlusion of the right middle lobe bronchus and occlusion of the posterior segmental branch of the right lower lobe bronchus, series 506 image 154 and 176.. Again seen is consolidative airspace opacity in the right middle lobe with air bronchograms, not significantly changed in the interval since the recent comparison examination. Atelectasis and small amount of superposed infiltrate also seen at the right lung base, stable from the comparison examination. Stable biapical fibrosis is again seen.. A 6 mm enhancing focus is seen in the right posterior hepatic segment, series 501 image 284, possibly a flash filling hemangioma however remains nonspecific on a single phase CT. There are no acute fractures.  No suspicious bony lesions.    1. No central pulmonary embolism, aortic aneurysm or  dissection. 2. Endobronchial hypodensity with occlusion of the right middle lobe bronchus and occlusion of the posterior segmental branch of the right lower lobe bronchus. Again seen is consolidative airspace opacity in the right middle lobe with air bronchograms, not significantly changed in the interval since the recent comparison examination. Follow-up can document resolution to exclude any underlying pathology. 3. Prominent mediastinal and right hilar lymph nodes, possibly reactive however remain nonspecific and amenable to follow-up. Signed by Foster Watson MD    CT chest wo IV contrast    Result Date: 3/16/2025  STUDY: CT Chest without IV Contrast; 03/16/2025, 2:31 PM INDICATION: Cough. COMPARISON: CT chest 01/22/2025,11/30/2024. ACCESSION NUMBER(S): [id number] ORDERING CLINICIAN: KALA KELLY TECHNIQUE:  CT of the chest was performed without contrast. Automated mA/kV exposure control was utilized and patient examination was performed in strict accordance with principles of ALARA. FINDINGS: MEDIASTINUM: The heart is normal in size without pericardial effusion.  Coronary artery calcifications are identified.  LUNGS/PLEURA: There is no pleural effusion, pleural thickening, or pneumothorax. The airways are patent. There is a dense consolidating infiltrate in the right middle lobe. There is a small infiltrate posteriorly in the right lower lobe. LYMPH NODES: Thoracic lymph nodes are not enlarged. UPPER ABDOMEN: Upper abdomen demonstrates no acute pathology. BONES: There are no acute fractures.  No suspicious bony lesions.    Consolidating infiltrate in the right middle lobe with small posterior right lower lobe infiltrate.  These findings have increased from January 2025. Signed by Gerald Neely MD    XR chest 1 view    Result Date: 3/16/2025  STUDY: Chest Radiograph; 03/16/2025 9:54 AM INDICATION: Chest pain. COMPARISON: XR chest 11/30/2024, 12/06/2023. ACCESSION NUMBER(S): [id number] ORDERING CLINICIAN:  KALA KELLY TECHNIQUE:  Frontal chest was obtained at 09:54:00 hours. FINDINGS: CARDIOMEDIASTINAL SILHOUETTE: Cardiomediastinal silhouette is normal in size and configuration.  LUNGS: Patchy consolidation in the right base.  Underlying emphysema.  ABDOMEN: No remarkable upper abdominal findings.  BONES: No acute osseous changes.    Patchy consolidation in the right base. Correlate clinically for pneumonia. Signed by Reilly Lama MD      Assessment  Nciolle Campbell IS 69 y.o. female  on day  2 of admission presenting with Pneumonia due to infectious organism, unspecified laterality, unspecified part of lung. Actively being treated for the  following medical Problems:    Acute hypoxic respiratory failure  Recurrent right middle lobe infiltrate  Right lower lobe atelectasis  Bronchiectasis   cachexia BMI of 17.7      Recommendation:  Clinically suspect right middle lobe syndrome with differential includes recurrent aspiration, chronic infections, less likely foreign body  Would attempt to do a bronchoscopy for diagnostic and therapeutic purposes, I explained to the patient that being on and off on quinolones of Levaquin and IV azithromycin currently will reduce the yield of bronchoscopy especially for MAC infection growth on BAL, she understands that then she would still like to continue with the bronchoscopy  Oxygen for saturation of 89 to 94%  Incentive spirometry  Bronchodilators therapy as needed  PT/OT  DVT prophylaxis  Minimize benzodiazepine and narcotics  Encourage ambulation  Head evaluation and aspiration precautions.  Patient is on home aggressive bronchopulmonary hygiene with bronchodilator therapy, CoughAssist devices and mucolytic therapy      Waylon Roman MD  Pulmonary critical care consultant  03/18/25  7:18 PM       STAFF PHYSICIAN NOTE OF PERSONAL INVOLVEMENT IN CARE  As the attending physician, I certify that I personally reviewed the patient's history and personally examined the patient to  confirm the physical findings described above, and that I reviewed the relevant imaging studies and available reports.  I also discussed the differential diagnosis and all of the proposed management plans with the patient and individuals accompanying the patient to this visit.  They had the opportunity to ask questions about the proposed management plans and to have those questions answered.

## 2025-03-18 NOTE — PROGRESS NOTES
Nicolle Campbell is a 69 y.o. female on day 2 of admission presenting with Pneumonia due to infectious organism, unspecified laterality, unspecified part of lung.      Subjective         Tyree Teixeira MD   Physician  Internal Medicine     H&P     Signed     Date of Service: 3/17/2025  2:59 PM     Signed       Expand All Collapse All    History Of Present Illness  Nicolle Campbell is a 69 y.o. female  With a past medical history of asthma, pneumonia, right lung collapse, seasonal allergies who presents to [facility] for evaluation of shortness of breath.  Patient reports she went to see her pulmonologist and was given doxycycline for pneumonia which she completed.  She states she started feeling worse on Friday complaining of fatigue, decreased appetite, and shortness of breath which began around 4 AM this morning.  She denies any aggravating or alleviating factors.  She reports that she feels shortness of breath at rest and with exertion.  She is reporting a productive cough with yellow sputum.  She denies any fevers or chills.  She is reporting chest pain which worsens with cough and palpation.  No reports of nausea vomiting.     ED course: Afebrile, hemodynamically stable, tachypneic with respiratory rate 21-36.  Labs notable for glucose 110, sodium 135.  Troponin negative x 2.  D-dimer 720, platelet 452, eosinophils 1.23.  Flu A/COVID-negative.  CTA chest negative for PE.  Revealing endobronchial hypodensity with occlusion of the right middle lobe bronchus and occlusion of the posterior segmental branch of the right lower lobe bronchus.  Full impression to follow.  Cefepime, azithromycin, vancomycin, Solu-Medrol, Benadryl in ER.     10 point review of systems has been performed and is negative except what is stated above     Patient has been consult placed to pulmonology for further medical management      Past Medical History  Medical History        Past Medical History:   Diagnosis Date    Acute sinusitis,  unspecified 07/19/2018     Acute sinusitis    Acute upper respiratory infection, unspecified 07/19/2018     URI (upper respiratory infection)    Concussion without loss of consciousness, initial encounter 07/19/2018     Concussion w/o coma    Dizziness and giddiness 07/19/2018     Vertigo    Encounter for immunization 12/20/2022     Need for vaccination    Epistaxis 07/19/2018     Epistaxis    Frequency of micturition 07/19/2018     Urinary frequency    Headache 07/19/2018     Cephalgia    Laceration without foreign body, right lower leg, initial encounter 07/19/2018     Laceration of right lower extremity, initial encounter    Moderate persistent asthma, uncomplicated ([facility]) 07/19/2018     Asthma, moderate persistent    Other allergy status, other than to drugs and biological substances       Environmental allergies    Other malaise 07/19/2018     Malaise and fatigue    Personal history of other diseases of the respiratory system 02/01/2018     History of upper respiratory infection    Personal history of other diseases of the respiratory system 08/16/2021     History of acute sinusitis    Personal history of other diseases of the respiratory system 07/09/2015     History of asthma    Personal history of other specified conditions 07/09/2015     History of abnormal Pap smear    Unspecified fall, initial encounter 06/07/2016     Fall    Unspecified hearing loss, bilateral 07/19/2018     Hearing loss, bilateral    Unspecified otitis externa, left ear 03/19/2021     Left otitis externa    Urinary tract infection, site not specified 07/19/2018     UTI (urinary tract infection)            Surgical History  Surgical History         Past Surgical History:   Procedure Laterality Date    OTHER SURGICAL HISTORY   06/29/2021     Cataract surgery            Social History  She reports that she has never smoked. She has never used smokeless tobacco. She reports that she does not drink alcohol and does not use drugs.    "  Family History  Family History          Family History   Problem Relation Name Age of Onset    No Known Problems Mother        Diabetes Father        Diabetes Daughter                Allergies  Bee pollen, Corn, Penicillin, Prednisone, and Wheat        Physical Exam  Constitutional:       Appearance: She is normal weight.   HENT:      Nose: Nose normal.      Mouth/Throat:      Mouth: Mucous membranes are moist.      Pharynx: Oropharynx is clear.   Eyes:      Conjunctiva/sclera: Conjunctivae normal.   Cardiovascular:      Rate and Rhythm: Normal rate and regular rhythm.      Heart sounds: Normal heart sounds.   Pulmonary:      Effort: Pulmonary effort is normal.      Comments: Diminished bilateral bases  Abdominal:      General: Abdomen is flat.      Palpations: Abdomen is soft.   Musculoskeletal:         General: Normal range of motion.      Cervical back: Normal range of motion and neck supple.   Skin:     General: Skin is warm.   Neurological:      General: No focal deficit present.      Mental Status: She is alert.            Last Recorded Vitals  Blood pressure 109/58, pulse 91, temperature 36.8 °C (98.2 °F), temperature source Temporal, resp. rate (!) 30, height 1.676 m (5' 6\"), weight 49.9 kg (110 lb), SpO2 98%.     Scheduled medications    Scheduled Medications   azithromycin, 500 mg, intravenous, q24h  cefTRIAXone, 2 g, intravenous, q24h  enoxaparin, 40 mg, subcutaneous, Daily  fluticasone furoate-vilanteroL, 1 puff, inhalation, Daily  ipratropium-albuteroL, 3 mL, nebulization, TID  methylPREDNISolone sodium succinate (PF), 40 mg, intravenous, q24h  montelukast, 10 mg, oral, Nightly         Continuous medications  Continuous Medications         PRN medications  PRN Medications   PRN medications: acetaminophen, albuterol, benzonatate, guaiFENesin, oxygen     ECG 12 lead     Result Date: 3/17/2025  Sinus rhythm Atrial premature complex     CT angio chest for pulmonary embolism     Result Date: " 3/16/2025  STUDY: CT Angiogram of the Chest; 3/16/2025 3:41 PM INDICATION: Chest pain. COMPARISON: CT chest 3/16/2025. ACCESSION NUMBER(S): [id number] ORDERING CLINICIAN: KALA KELLY TECHNIQUE:  CTA of the chest was performed with intravenous contrast. Images are reviewed and processed at a workstation according to the CT angiogram protocol with 3-D and/or MIP post processing imaging generated.  Omnipaque 350 75 mL was administered intravenously. Automated mA/kV exposure control was utilized and patient examination was performed in strict accordance with principles of ALARA. FINDINGS: No central filling defect is seen within the main, lobar or proximal segmental pulmonary arteries to suggest an acute pulmonary embolism.. No thoracic aortic aneurysm, dissection or periaortic inflammation is seen.. Heart remains normal in size without pericardial or significant pleural effusion..  Multiple prominent mediastinal and right hilar lymph nodes are seen, possibly reactive however remain nonspecific and amenable to follow-up. There is no pleural effusion, pleural thickening, or pneumothorax. The trachea and right and left mainstem bronchi are patent. There is endobronchial hypodensity with occlusion of the right middle lobe bronchus and occlusion of the posterior segmental branch of the right lower lobe bronchus, series 506 image 154 and 176.. Again seen is consolidative airspace opacity in the right middle lobe with air bronchograms, not significantly changed in the interval since the recent comparison examination. Atelectasis and small amount of superposed infiltrate also seen at the right lung base, stable from the comparison examination. Stable biapical fibrosis is again seen.. A 6 mm enhancing focus is seen in the right posterior hepatic segment, series 501 image 284, possibly a flash filling hemangioma however remains nonspecific on a single phase CT. There are no acute fractures.  No suspicious bony lesions.      1. No central pulmonary embolism, aortic aneurysm or dissection. 2. Endobronchial hypodensity with occlusion of the right middle lobe bronchus and occlusion of the posterior segmental branch of the right lower lobe bronchus. Again seen is consolidative airspace opacity in the right middle lobe with air bronchograms, not significantly changed in the interval since the recent comparison examination. Follow-up can document resolution to exclude any underlying pathology. 3. Prominent mediastinal and right hilar lymph nodes, possibly reactive however remain nonspecific and amenable to follow-up. Signed by Foster Watson MD     CT chest wo IV contrast     Result Date: 3/16/2025  STUDY: CT Chest without IV Contrast; 03/16/2025, 2:31 PM INDICATION: Cough. COMPARISON: CT chest 01/22/2025,11/30/2024. ACCESSION NUMBER(S): [id number] ORDERING CLINICIAN: KALA KELLY TECHNIQUE:  CT of the chest was performed without contrast. Automated mA/kV exposure control was utilized and patient examination was performed in strict accordance with principles of ALARA. FINDINGS: MEDIASTINUM: The heart is normal in size without pericardial effusion.  Coronary artery calcifications are identified.  LUNGS/PLEURA: There is no pleural effusion, pleural thickening, or pneumothorax. The airways are patent. There is a dense consolidating infiltrate in the right middle lobe. There is a small infiltrate posteriorly in the right lower lobe. LYMPH NODES: Thoracic lymph nodes are not enlarged. UPPER ABDOMEN: Upper abdomen demonstrates no acute pathology. BONES: There are no acute fractures.  No suspicious bony lesions.     Consolidating infiltrate in the right middle lobe with small posterior right lower lobe infiltrate.  These findings have increased from January 2025. Signed by Gerald Neely MD     XR chest 1 view     Result Date: 3/16/2025  STUDY: Chest Radiograph; 03/16/2025 9:54 AM INDICATION: Chest pain. COMPARISON: XR chest 11/30/2024,  12/06/2023. ACCESSION NUMBER(S): [id number] ORDERING CLINICIAN: KALA KELLY TECHNIQUE:  Frontal chest was obtained at 09:54:00 hours. FINDINGS: CARDIOMEDIASTINAL SILHOUETTE: Cardiomediastinal silhouette is normal in size and configuration.  LUNGS: Patchy consolidation in the right base.  Underlying emphysema.  ABDOMEN: No remarkable upper abdominal findings.  BONES: No acute osseous changes.     Patchy consolidation in the right base. Correlate clinically for pneumonia. Signed by Reilly Lama MD         Results for orders placed or performed during the hospital encounter of 03/16/25 (from the past 24 hours)   MRSA Surveillance for Vancomycin De-escalation, PCR     Specimen: Anterior Nares; Swab   Result Value Ref Range     MRSA PCR Not Detected Not Detected   Legionella Antigen, Urine     Specimen: Clean Catch/Voided; Urine   Result Value Ref Range     L. pneumophila Urine Ag Negative Negative   Streptococcus pneumoniae Antigen, Urine     Specimen: Clean Catch/Voided; Urine   Result Value Ref Range     Streptococcus pneumoniae Ag, Urine Negative Negative   CBC   Result Value Ref Range     WBC 12.0 (H) 4.4 - 11.3 x10*3/uL     nRBC 0.0 0.0 - 0.0 /100 WBCs     RBC 4.32 4.00 - 5.20 x10*6/uL     Hemoglobin 11.5 (L) 12.0 - 16.0 g/dL     Hematocrit 35.5 (L) 36.0 - 46.0 %     MCV 82 80 - 100 fL     MCH 26.6 26.0 - 34.0 pg     MCHC 32.4 32.0 - 36.0 g/dL     RDW 14.0 11.5 - 14.5 %     Platelets 443 150 - 450 x10*3/uL   Basic Metabolic Panel   Result Value Ref Range     Glucose 135 (H) 74 - 99 mg/dL     Sodium 135 (L) 136 - 145 mmol/L     Potassium 3.8 3.5 - 5.3 mmol/L     Chloride 104 98 - 107 mmol/L     Bicarbonate 22 21 - 32 mmol/L     Anion Gap 13 10 - 20 mmol/L     Urea Nitrogen 19 6 - 23 mg/dL     Creatinine 0.85 0.50 - 1.05 mg/dL     eGFR 74 >60 mL/min/1.73m*2     Calcium 9.2 8.6 - 10.3 mg/dL                           Assessment/Plan     Assessment & Plan  Dyspnea     Pneumonia     Pneumonia due to infectious  organism, unspecified laterality, unspecified part of lung        Nicolle Campbell is a 69 y.o. female  With a past medical history of asthma, pneumonia, right lung collapse, seasonal allergies who [place] to [facility] for [place] of shortness of breath.  Patient reports she went to see her pulmonologist and was given doxycycline for pneumonia which she completed.  She states she started feeling worse on Friday complaining of fatigue, decreased appetite, and shortness of breath which began around 4 AM this morning.  She denies any aggravating or alleviating factors.  She reports that she feels shortness of breath at rest and with exertion.  She is reporting a productive cough with yellow sputum.  She denies any fevers or chills.  She is reporting chest pain which worsens with cough and palpation.  No reports of nausea vomiting.     #Pneumonia  #Shortness of breath  #History asthma  Admit under the care of Dr. Teixeira  Consult pulmonology, appreciate input  cefepime, azithromycin, vancomycin in ER-continue azithromycin/rocephin-defer vanco to attending/pulmonology   Continue Solu-Medrol  Continue home inhalers  Urine strep/Legionella  Sputum culture  Supplemental O2 as needed  mucinex     #DVT ppx  Lovenox  SCD       Chief Complaint   Patient presents with    Chest Pain    Shortness of Breath       Patient arrives from home with complaints of chest pressure and tightness that has been ongoing for the past 2 days.  Patient also notes SOB and has a history of asthma.  Patient states she has been coughing constantly for the past 3 days.  Patient also states she was diagnosed with a collapsed lung back in November.          76-year-old female presenting with chest pain which is right-sided.  Significant past medical history for asthma, lung collapse right middle lobe.  Patient states that she was diagnosed with right middle lobe collapse in November.  She is been following with the pulmonologist at [facility].   Patient was on 2 weeks of Levaquin and then 10 days of doxycycline.  She has been off of these antibiotics for couple weeks now.  Patient states that she is getting worsening pain in the right side of her chest and she feels like she cannot catch her breath.  She has been using her breathing treatments without success.  She denies fevers or chills but she does feel like she is weak.  She states he is coughing a lot more.                          had concerns including Chest Pain and Shortness of Breath (Patient arrives from home with complaints of chest pressure and tightness that has been ongoing for the past 2 days.  Patient also notes SOB and has a history of asthma.  Patient states she has been coughing constantly for the past 3 days.  Patient also states she was diagnosed with a collapsed lung back in November. ).        Problem List Items Addressed This Visit         Dyspnea     * (Principal) Pneumonia due to infectious organism, unspecified laterality, unspecified part of lung - Primary      Other Visit Diagnoses         Failure of outpatient treatment         Chest pain, unspecified type                    HPI         Shortness of Breath     Additional comments: Patient arrives from home with complaints of chest pressure and tightness that has been ongoing for the past 2 days.  Patient also notes SOB and has a history of asthma.  Patient states she has been coughing constantly for the past 3 days.  Patient also states she was diagnosed with a collapsed lung back in November.             Last edited by Сергей Spivey RN on 3/16/2025  9:08 AM.             Problem List as of 3/17/2025 Reviewed: 1/28/2025  2:23 PM by Jacoby Conklin DO        AB (asthmatic bronchitis) ([facility])     Acute URI     Asthma, moderate persistent ([facility])     Last Assessment & Plan 1/7/2025 Office Visit Written 1/7/2025  3:59 PM by Jacoby Conklin DO          Orders:    Urinalysis with Reflex Microscopic; Future    CBC and Auto  Differential; Future              Astigmatism, bilateral     Bilateral presbyopia     Cephalgia     Concussion w/o coma     Cortical age-related cataract of both eyes     Epistaxis     Fall     Genitourinary syndrome of menopause     Hearing impairment     Hearing loss, bilateral     Hypermetropia of both eyes     Laceration of right lower extremity     Left otitis externa     Malaise and fatigue     Mixed incontinence urge and stress     Nuclear sclerosis of both eyes     Pelvic pain in female     PVD (posterior vitreous detachment), both eyes     Tooth caries     Urinary frequency     UTI (urinary tract infection)     UTI symptoms     Vaginal prolapse     Vertigo     Pneumonia of right middle lobe due to infectious organism     Collapse of right lung     Last Assessment & Plan 1/7/2025 Office Visit Written 1/7/2025  3:59 PM by Jacoby Conklin DO          Orders:    Urinalysis with Reflex Microscopic; Future    Comprehensive Metabolic Panel; Future    CBC and Auto Differential; Future              Dyspnea     Pneumonia     * (Principal) Pneumonia due to infectious organism, unspecified laterality, unspecified part of lung              Active Ambulatory Problems     Diagnosis Date Noted    AB (asthmatic bronchitis) ([facility]) 07/13/2023    Acute URI 07/13/2023    Asthma, moderate persistent ([facility]) 07/13/2023    Astigmatism, bilateral 07/13/2023    Bilateral presbyopia 07/13/2023    Cephalgia 07/13/2023    Concussion w/o coma 07/13/2023    Cortical age-related cataract of both eyes 07/13/2023    Epistaxis 07/13/2023    Fall 07/13/2023    Genitourinary syndrome of menopause 07/13/2023    Hearing impairment 07/13/2023    Hearing loss, bilateral 07/13/2023    Hypermetropia of both eyes 07/13/2023    Laceration of right lower extremity 07/13/2023    Left otitis externa 07/13/2023    Malaise and fatigue 07/13/2023    Mixed incontinence urge and stress 07/13/2023    Nuclear sclerosis of both eyes 07/13/2023    Pelvic  pain in female 07/13/2023    PVD (posterior vitreous detachment), both eyes 07/13/2023    Tooth caries 07/13/2023    Urinary frequency 07/13/2023    UTI (urinary tract infection) 07/13/2023    UTI symptoms 07/13/2023    Vaginal prolapse 07/13/2023    Vertigo 07/13/2023    Pneumonia of right middle lobe due to infectious organism 12/05/2024    Collapse of right lung 12/05/2024           Resolved Ambulatory Problems     Diagnosis Date Noted    No Resolved Ambulatory Problems           Past Medical History:   Diagnosis Date    Acute sinusitis, unspecified 07/19/2018    Acute upper respiratory infection, unspecified 07/19/2018    Concussion without loss of consciousness, initial encounter 07/19/2018    Dizziness and giddiness 07/19/2018    Encounter for immunization 12/20/2022    Frequency of micturition 07/19/2018    Headache 07/19/2018    Laceration without foreign body, right lower leg, initial encounter 07/19/2018    Moderate persistent asthma, uncomplicated ([facility]) 07/19/2018    Other allergy status, other than to drugs and biological substances      Other malaise 07/19/2018    Personal history of other diseases of the respiratory system 02/01/2018    Personal history of other diseases of the respiratory system 08/16/2021    Personal history of other diseases of the respiratory system 07/09/2015    Personal history of other specified conditions 07/09/2015    Unspecified fall, initial encounter 06/07/2016    Unspecified hearing loss, bilateral 07/19/2018    Unspecified otitis externa, left ear 03/19/2021    Urinary tract infection, site not specified 07/19/2018         Thorough record review performed of previous labs and notes from prior encounters.      Patient fully evaluated  03/17                Active Orders   Microbiology     Respiratory Culture/Smear       Frequency: Once       Number of Occurrences: 1 Occurrences       Order Comments: Sterile Container-Ambient TemperatureSpecial Instructions: Bacterial  culture with Gram stain smear. Preferably, collect specimen before antibiotics are administered.         Nursing     Apply sequential compression device       Frequency: Until discontinued       Number of Occurrences: Until Specified     Height on admission       Frequency: Once       Number of Occurrences: 1 Occurrences     Notify provider       Frequency: Until discontinued       Number of Occurrences: Until Specified     Obtain cultures before antibiotics are started. Administer antimicrobial therapy within 4 hours of presentation.       Frequency: Until discontinued       Number of Occurrences: Until Specified     Vital Signs       Frequency: q4h       Number of Occurrences: Until Specified     Weight on admission       Frequency: Once       Number of Occurrences: 1 Occurrences   Consult     Inpatient consult to Dietitian       Frequency: Once       Number of Occurrences: 1 Occurrences       Order Comments: Nursing Admission Screening        Inpatient consult to Pulmonology       Frequency: Once       Number of Occurrences: 1 Occurrences   Nourishments     May Participate in Room Service       Frequency: Once       Number of Occurrences: 1 Occurrences   Respiratory Care     Respiratory care eval and treat       Frequency: Once       Number of Occurrences: 1 Occurrences   Admission     Admit to inpatient       Frequency: Once       Number of Occurrences: 1 Occurrences   Telemetry     Telemetry monitoring       Frequency: Until discontinued       Number of Occurrences: 3 Days   Medications     acetaminophen (Tylenol) tablet 650 mg       Frequency: q4h PRN       Dose: 650 mg       Route: oral     albuterol 2.5 mg /3 mL (0.083 %) nebulizer solution 3 mL       Frequency: q2h PRN       Dose: 3 mL       Route: nebulization     azithromycin (Zithromax) 500 mg in dextrose 5%  mL       Frequency: q24h       Dose: 500 mg       Route: intravenous     benzonatate (Tessalon) capsule 100 mg       Frequency: TID PRN        "Dose: 100 mg       Route: oral     cefTRIAXone (Rocephin) 2 g in dextrose (iso) IV 50 mL       Frequency: q24h       Dose: 2 g       Route: intravenous     enoxaparin (Lovenox) syringe 40 mg       Frequency: Daily       Dose: 40 mg       Route: subcutaneous     fluticasone furoate-vilanteroL (Breo Ellipta) 200-25 mcg/dose inhaler 1 puff       Frequency: Daily       Dose: 1 puff       Route: inhalation     guaiFENesin (Mucinex) 12 hr tablet 600 mg       Frequency: BID PRN       Dose: 600 mg       Route: oral     ipratropium-albuteroL (Duo-Neb) 0.5-2.5 mg/3 mL nebulizer solution 3 mL       Frequency: TID       Dose: 3 mL       Route: nebulization     methylPREDNISolone sod succinate (SOLU-Medrol) 40 mg/mL injection 40 mg       Frequency: q24h       Dose: 40 mg       Route: intravenous     montelukast (Singulair) tablet 10 mg       Frequency: Nightly       Dose: 10 mg       Route: oral     oxygen (O2) therapy       Frequency: Continuous PRN - O2/gases       Route: inhalation      Dietary Orders (From admission, onward)          Start     Ordered     03/17/25 1329   May Participate in Room Service  ( ROOM SERVICE MAY PARTICIPATE)  Once        Question:  .  Answer:  Yes    03/17/25 1328                       69 yrs@  ADMITDTTM@  Pneumonia due to infectious organism, unspecified laterality, unspecified part of lung [J18.9]  [unfilled]  Weight: 49.9 kg (110 lb)     Vitals          Vitals:     03/16/25 0908 03/16/25 1000 03/16/25 1100 03/16/25 1130   BP: 126/61 133/67 135/70 133/65   Pulse: 84 78 75 78   Resp: (!) 24 (!) 36 (!) 21 (!) 22   Temp: 36.8 °C (98.2 °F)         TempSrc: Temporal         SpO2: 95% 97% 97% 96%   Weight: 49.9 kg (110 lb)         Height: 1.676 m (5' 6\")           03/16/25 1200 03/16/25 1300 03/16/25 1400 03/16/25 1500   BP: 130/64 134/70 144/65 142/68   Pulse: 76 92 94 97   Resp: (!) 23 (!) 22 (!) 26 (!) 21   Temp:           TempSrc:           SpO2: 100% 94% 94% 94%   Weight:           Height:       "       03/16/25 2000 03/16/25 2100 03/16/25 2125 03/16/25 2200   BP:   154/62 117/60 114/61   Pulse: 98 93 100 99   Resp: (!) 26 (!) 22 20 (!) 26   Temp:           TempSrc:           SpO2: 95% 94% 95% (!) 93%   Weight:           Height:             03/16/25 2259 03/16/25 2300 03/17/25 0000 03/17/25 0015   BP: 118/58 118/58 116/55     Pulse: 93 91 (!) 103 (!) 109   Resp: (!) 21 (!) 21 (!) 23 (!) 31   Temp:           TempSrc:           SpO2: 94% 94% (!) 93% 96%   Weight:           Height:             03/17/25 0030 03/17/25 0045 03/17/25 0100 03/17/25 0115   BP:     118/55     Pulse: (!) 102 (!) 101 98 96   Resp: (!) 27 (!) 21 (!) 21 (!) 22   Temp:           TempSrc:           SpO2: 94% 94% (!) 93% 94%   Weight:           Height:             03/17/25 0130 03/17/25 0145 03/17/25 0200 03/17/25 0215   BP:     110/57     Pulse: 96 90 90 (!) 102   Resp: (!) 21 20 (!) 21 (!) 23   Temp:           TempSrc:           SpO2: 95% 94% 94% 96%   Weight:           Height:             03/17/25 0230 03/17/25 0245 03/17/25 0300 03/17/25 0315   BP:     128/69     Pulse: 90 87 91 88   Resp: 19 20 17 18   Temp:           TempSrc:           SpO2: (!) 93% 94% (!) 93% 95%   Weight:           Height:             03/17/25 0330 03/17/25 0345 03/17/25 0400 03/17/25 0415   BP:     126/78     Pulse: 88 87 87 88   Resp: 19 18 19 19   Temp:           TempSrc:           SpO2: 94% 95% 94% 95%   Weight:           Height:             03/17/25 0430 03/17/25 0445 03/17/25 0500 03/17/25 0558   BP:     105/51 110/61   Pulse: 85 91 88 85   Resp: 19 (!) 22 (!) 22 (!) 22   Temp:           TempSrc:           SpO2: 96% 95% 95% 97%   Weight:           Height:             03/17/25 0600 03/17/25 0700 03/17/25 0734 03/17/25 0800   BP: 106/55 109/63   106/56   Pulse: 83 81   93   Resp: (!) 21 (!) 21   (!) 24   Temp:           TempSrc:           SpO2: 97% 97% 98% 97%   Weight:           Height:             03/17/25 0858 03/17/25 1000 03/17/25 1100 03/17/25 1200   BP:    115/69 95/52 99/57   Pulse:   (!) 113 85 84   Resp:   (!) 39   (!) 29   Temp:           TempSrc:           SpO2: 98% 94% 99% 98%   Weight:           Height:             03/17/25 1400   BP: 109/58   Pulse: 91   Resp: (!) 30   Temp:     TempSrc:     SpO2: 98%   Weight:     Height:                    Chief Complaint    Chest Pain; Shortness of Breath            Patient seen resting in bed with head of bed elevated, no s/s or c/o acute difficulties at this time.  Vital signs for last 24 hours Heart Rate:  [] 91  Respirations:  [17-39] 30  BP: ()/(51-78) 109/58    No intake/output data recorded.  Patient still requiring frequent cardiac and SPO2 monitoring. Continue aggressive pulmonary hygiene and oral hygiene. Off loading as tolerated for skin integrity. Medications and labs reviewed-    Patient recently received an antibiotic (last 12 hours)         None                    Results for orders placed or performed during the hospital encounter of 03/16/25 (from the past 96 hours)   CBC and Auto Differential   Result Value Ref Range     WBC 8.6 4.4 - 11.3 x10*3/uL     nRBC 0.0 0.0 - 0.0 /100 WBCs     RBC 4.55 4.00 - 5.20 x10*6/uL     Hemoglobin 12.3 12.0 - 16.0 g/dL     Hematocrit 38.2 36.0 - 46.0 %     MCV 84 80 - 100 fL     MCH 27.0 26.0 - 34.0 pg     MCHC 32.2 32.0 - 36.0 g/dL     RDW 13.8 11.5 - 14.5 %     Platelets 452 (H) 150 - 450 x10*3/uL     Neutrophils % 60.5 40.0 - 80.0 %     Immature Granulocytes %, Automated 0.1 0.0 - 0.9 %     Lymphocytes % 18.1 13.0 - 44.0 %     Monocytes % 6.5 2.0 - 10.0 %     Eosinophils % 14.3 0.0 - 6.0 %     Basophils % 0.5 0.0 - 2.0 %     Neutrophils Absolute 5.20 1.20 - 7.70 x10*3/uL     Immature Granulocytes Absolute, Automated 0.01 0.00 - 0.70 x10*3/uL     Lymphocytes Absolute 1.56 1.20 - 4.80 x10*3/uL     Monocytes Absolute 0.56 0.10 - 1.00 x10*3/uL     Eosinophils Absolute 1.23 (H) 0.00 - 0.70 x10*3/uL     Basophils Absolute 0.04 0.00 - 0.10 x10*3/uL   Comprehensive  metabolic panel   Result Value Ref Range     Glucose 110 (H) 74 - 99 mg/dL     Sodium 135 (L) 136 - 145 mmol/L     Potassium 4.9 3.5 - 5.3 mmol/L     Chloride 102 98 - 107 mmol/L     Bicarbonate 27 21 - 32 mmol/L     Anion Gap 11 10 - 20 mmol/L     Urea Nitrogen 13 6 - 23 mg/dL     Creatinine 0.82 0.50 - 1.05 mg/dL     eGFR 77 >60 mL/min/1.73m*2     Calcium 9.2 8.6 - 10.3 mg/dL     Albumin 4.1 3.4 - 5.0 g/dL     Alkaline Phosphatase 104 33 - 136 U/L     Total Protein 7.5 6.4 - 8.2 g/dL     AST 24 9 - 39 U/L     Bilirubin, Total 0.4 0.0 - 1.2 mg/dL     ALT 20 7 - 45 U/L   B-Type Natriuretic Peptide   Result Value Ref Range     BNP 36 0 - 99 pg/mL   D-Dimer, VTE Exclusion   Result Value Ref Range     D-Dimer, Quantitative VTE Exclusion 720 (H) <=500 ng/mL FEU   Sars-CoV-2 and Influenza A/B PCR   Result Value Ref Range     Flu A Result Not Detected Not Detected     Flu B Result Not Detected Not Detected     Coronavirus 2019, PCR Not Detected Not Detected   RSV PCR   Result Value Ref Range     RSV PCR Not Detected Not Detected   Troponin I, High Sensitivity, Initial   Result Value Ref Range     Troponin I, High Sensitivity 7 0 - 13 ng/L   ECG 12 lead   Result Value Ref Range     Ventricular Rate 95 BPM     Atrial Rate 95 BPM     VT Interval 137 ms     QRS Duration 98 ms     QT Interval 362 ms     QTC Calculation(Bazett) 455 ms     P Axis 69 degrees     R Axis 53 degrees     T Axis 53 degrees     QRS Count 16 beats     Q Onset 254 ms     T Offset 435 ms     QTC Fredericia 421 ms   Troponin, High Sensitivity, 1 Hour   Result Value Ref Range     Troponin I, High Sensitivity 7 0 - 13 ng/L   MRSA Surveillance for Vancomycin De-escalation, PCR     Specimen: Anterior Nares; Swab   Result Value Ref Range     MRSA PCR Not Detected Not Detected   Legionella Antigen, Urine     Specimen: Clean Catch/Voided; Urine   Result Value Ref Range     L. pneumophila Urine Ag Negative Negative   Streptococcus pneumoniae Antigen, Urine      Specimen: Clean Catch/Voided; Urine   Result Value Ref Range     Streptococcus pneumoniae Ag, Urine Negative Negative   CBC   Result Value Ref Range     WBC 12.0 (H) 4.4 - 11.3 x10*3/uL     nRBC 0.0 0.0 - 0.0 /100 WBCs     RBC 4.32 4.00 - 5.20 x10*6/uL     Hemoglobin 11.5 (L) 12.0 - 16.0 g/dL     Hematocrit 35.5 (L) 36.0 - 46.0 %     MCV 82 80 - 100 fL     MCH 26.6 26.0 - 34.0 pg     MCHC 32.4 32.0 - 36.0 g/dL     RDW 14.0 11.5 - 14.5 %     Platelets 443 150 - 450 x10*3/uL   Basic Metabolic Panel   Result Value Ref Range     Glucose 135 (H) 74 - 99 mg/dL     Sodium 135 (L) 136 - 145 mmol/L     Potassium 3.8 3.5 - 5.3 mmol/L     Chloride 104 98 - 107 mmol/L     Bicarbonate 22 21 - 32 mmol/L     Anion Gap 13 10 - 20 mmol/L     Urea Nitrogen 19 6 - 23 mg/dL     Creatinine 0.85 0.50 - 1.05 mg/dL     eGFR 74 >60 mL/min/1.73m*2     Calcium 9.2 8.6 - 10.3 mg/dL      Plan discussed with interdisciplinary team, consults placed, appreciate input. Will continue current and repeat labs in the AM.       Discharge planning discussed with patient and care team. Therapy evaluations ordered. Patient aware and agreeable to current plan, continue plan as above.      I spent a total of 75 minutes on the date of the service which included preparing to see the patient, face-to-face patient care, completing clinical documentation, obtaining and/or reviewing separately obtained history, performing a medically appropriate examination, counseling and educating the patient/family/caregiver, ordering medications, tests, or procedures, communicating with other HCPs (not separately reported), independently interpreting results (not separately reported), communicating results to the patient/family/caregiver, and care coordination (not separately reported).         Tahira Bacon                 Revision History            Objective     Last Recorded Vitals  /63   Pulse 83   Temp 36.3 °C (97.3 °F)   Resp 19   Wt 49.9 kg (110 lb)   SpO2  95%   Intake/Output last 3 Shifts:    Intake/Output Summary (Last 24 hours) at 3/18/2025 1615  Last data filed at 3/18/2025 0500  Gross per 24 hour   Intake 350 ml   Output --   Net 350 ml       Admission Weight  Weight: 49.9 kg (110 lb) (03/16/25 0908)    Daily Weight  03/16/25 : 49.9 kg (110 lb)    Image Results  ECG 12 lead  Sinus rhythm  Atrial premature complex      Physical Exam    Relevant Results               Assessment/Plan        Assessment & Plan  Dyspnea    Pneumonia    Pneumonia due to infectious organism, unspecified laterality, unspecified part of lung    Patient fully evaluated 3/18   for    Problem List Items Addressed This Visit          Pulmonary and Pneumonias    Dyspnea    * (Principal) Pneumonia due to infectious organism, unspecified laterality, unspecified part of lung - Primary     Other Visit Diagnoses       Failure of outpatient treatment        Chest pain, unspecified type              Patient seen resting in bed with head of bed elevated, no s/s or c/o acute difficulties at this time.  Vital signs for last 24 hours Temp:  [35.9 °C (96.6 °F)-36.3 °C (97.3 °F)] 36.3 °C (97.3 °F)  Heart Rate:  [76-93] 83  Resp:  [17-26] 19  BP: (100-128)/(51-70) 114/63    No intake/output data recorded.  Patient still requiring frequent cardiac and SPO2 monitoring. Continue aggressive pulmonary hygiene and oral hygiene. Off loading as tolerated for skin integrity. Medications and labs reviewed-   Results for orders placed or performed during the hospital encounter of 03/16/25 (from the past 24 hours)   CBC and Auto Differential   Result Value Ref Range    WBC 11.9 (H) 4.4 - 11.3 x10*3/uL    nRBC 0.0 0.0 - 0.0 /100 WBCs    RBC 4.56 4.00 - 5.20 x10*6/uL    Hemoglobin 12.1 12.0 - 16.0 g/dL    Hematocrit 38.7 36.0 - 46.0 %    MCV 85 80 - 100 fL    MCH 26.5 26.0 - 34.0 pg    MCHC 31.3 (L) 32.0 - 36.0 g/dL    RDW 14.3 11.5 - 14.5 %    Platelets 462 (H) 150 - 450 x10*3/uL    Neutrophils % 92.6 40.0 - 80.0 %     Immature Granulocytes %, Automated 0.5 0.0 - 0.9 %    Lymphocytes % 5.7 13.0 - 44.0 %    Monocytes % 0.7 2.0 - 10.0 %    Eosinophils % 0.3 0.0 - 6.0 %    Basophils % 0.2 0.0 - 2.0 %    Neutrophils Absolute 10.97 (H) 1.20 - 7.70 x10*3/uL    Immature Granulocytes Absolute, Automated 0.06 0.00 - 0.70 x10*3/uL    Lymphocytes Absolute 0.68 (L) 1.20 - 4.80 x10*3/uL    Monocytes Absolute 0.08 (L) 0.10 - 1.00 x10*3/uL    Eosinophils Absolute 0.04 0.00 - 0.70 x10*3/uL    Basophils Absolute 0.02 0.00 - 0.10 x10*3/uL   Comprehensive Metabolic Panel   Result Value Ref Range    Glucose 157 (H) 74 - 99 mg/dL    Sodium 135 (L) 136 - 145 mmol/L    Potassium 4.6 3.5 - 5.3 mmol/L    Chloride 104 98 - 107 mmol/L    Bicarbonate 23 21 - 32 mmol/L    Anion Gap 13 10 - 20 mmol/L    Urea Nitrogen 22 6 - 23 mg/dL    Creatinine 0.87 0.50 - 1.05 mg/dL    eGFR 72 >60 mL/min/1.73m*2    Calcium 9.2 8.6 - 10.3 mg/dL    Albumin 3.9 3.4 - 5.0 g/dL    Alkaline Phosphatase 87 33 - 136 U/L    Total Protein 7.1 6.4 - 8.2 g/dL    AST 21 9 - 39 U/L    Bilirubin, Total 0.3 0.0 - 1.2 mg/dL    ALT 18 7 - 45 U/L      Patient recently received an antibiotic (last 12 hours)       Date/Time Action Medication Dose Rate    03/18/25 1544 New Bag    azithromycin (Zithromax) 500 mg in dextrose 5%  mL 500 mg 250 mL/hr           Patient was fully evaluated today 3/18. Vitals today include  Temp:  [35.9 °C (96.6 °F)-36.3 °C (97.3 °F)] 36.3 °C (97.3 °F), Heart Rate:  [76-93] 83, Resp:  [17-26] 19, BP: (100-128)/(51-70) 114/63. WBCs 11.9, Hbg12.1, Plts 462, Na 135, K 4.6, Cl 104, HCO3- 23, BUN 22, Cr 0.87, glucose 157. Appreciate pulm input concerning bronchoscopy. Continue to monitor.     Plan discussed with interdisciplinary team, continue current and repeat labs in the AM.     Discharge planning discussed with patient and care team. Patient aware and agreeable to current plan, continue plan as above.     I spent a total of 50 minutes on the date of the service  which included preparing to see the patient, face-to-face patient care, completing clinical documentation, obtaining and/or reviewing separately obtained history, performing a medically appropriate examination, counseling and educating the patient/family/caregiver, ordering medications, tests, or procedures, communicating with other HCPs (not separately reported), independently interpreting results (not separately reported), communicating results to the patient/family/caregiver, and care coordination (not separately reported).       MINH BLOCK

## 2025-03-18 NOTE — CARE PLAN
The clinical goals for the shift include pts O2 sat will remain above 93% throughout the shift      Problem: Respiratory  Goal: Clear secretions with interventions this shift  Outcome: Progressing     Problem: Respiratory  Goal: No signs of respiratory distress (eg. Use of accessory muscles. Peds grunting)  Outcome: Progressing     Problem: Respiratory  Goal: Patent airway maintained this shift  Outcome: Progressing     Problem: Respiratory  Goal: Verbalize decreased shortness of breath this shift  Outcome: Progressing     Problem: Respiratory  Goal: Wean oxygen to maintain O2 saturation per order/standard this shift  Outcome: Progressing

## 2025-03-18 NOTE — PROGRESS NOTES
03/18/25 1539   Discharge Planning   Living Arrangements Spouse/significant other   Support Systems Spouse/significant other;Children;Family members   Type of Residence Private residence   Number of Stairs to Enter Residence 1   Number of Stairs Within Residence 10   Expected Discharge Disposition Home   Does the patient need discharge transport arranged? No     I met with patient and her daughter at the bedside, verified insurance and demographics.  Patient lives with her .  She does not use mobility aids, denies falls, drives and is independent with ADLs.  Patient currently denies homegoing needs but she and her daughter are upset that she has not been seen by pulmonology yet and was told she is being discharged tomorrow.  Care Coordination team following for assistance with discharge planning.  Fouzia COSTELLO TCC

## 2025-03-19 ENCOUNTER — APPOINTMENT (OUTPATIENT)
Dept: RADIOLOGY | Facility: HOSPITAL | Age: 70
DRG: 191 | End: 2025-03-19
Payer: MEDICARE

## 2025-03-19 ENCOUNTER — ANESTHESIA EVENT (OUTPATIENT)
Dept: GASTROENTEROLOGY | Facility: HOSPITAL | Age: 70
End: 2025-03-19
Payer: MEDICARE

## 2025-03-19 ENCOUNTER — APPOINTMENT (OUTPATIENT)
Dept: GASTROENTEROLOGY | Facility: HOSPITAL | Age: 70
DRG: 191 | End: 2025-03-19
Payer: MEDICARE

## 2025-03-19 ENCOUNTER — ANESTHESIA (OUTPATIENT)
Dept: GASTROENTEROLOGY | Facility: HOSPITAL | Age: 70
End: 2025-03-19
Payer: MEDICARE

## 2025-03-19 LAB
ALBUMIN SERPL BCP-MCNC: 3.8 G/DL (ref 3.4–5)
ALP SERPL-CCNC: 97 U/L (ref 33–136)
ALT SERPL W P-5'-P-CCNC: 18 U/L (ref 7–45)
ANION GAP SERPL CALC-SCNC: 13 MMOL/L (ref 10–20)
AST SERPL W P-5'-P-CCNC: 18 U/L (ref 9–39)
ATRIAL RATE: 95 BPM
BASOPHILS # BLD AUTO: 0.02 X10*3/UL (ref 0–0.1)
BASOPHILS NFR BLD AUTO: 0.2 %
BILIRUB SERPL-MCNC: 0.3 MG/DL (ref 0–1.2)
BUN SERPL-MCNC: 23 MG/DL (ref 6–23)
CALCIUM SERPL-MCNC: 9.3 MG/DL (ref 8.6–10.3)
CHLORIDE SERPL-SCNC: 104 MMOL/L (ref 98–107)
CO2 SERPL-SCNC: 23 MMOL/L (ref 21–32)
CREAT SERPL-MCNC: 0.67 MG/DL (ref 0.5–1.05)
EGFRCR SERPLBLD CKD-EPI 2021: >90 ML/MIN/1.73M*2
EOSINOPHIL # BLD AUTO: 0.03 X10*3/UL (ref 0–0.7)
EOSINOPHIL NFR BLD AUTO: 0.2 %
ERYTHROCYTE [DISTWIDTH] IN BLOOD BY AUTOMATED COUNT: 14.2 % (ref 11.5–14.5)
GLUCOSE SERPL-MCNC: 168 MG/DL (ref 74–99)
HCT VFR BLD AUTO: 37 % (ref 36–46)
HGB BLD-MCNC: 11.7 G/DL (ref 12–16)
IMM GRANULOCYTES # BLD AUTO: 0.09 X10*3/UL (ref 0–0.7)
IMM GRANULOCYTES NFR BLD AUTO: 0.7 % (ref 0–0.9)
LYMPHOCYTES # BLD AUTO: 0.79 X10*3/UL (ref 1.2–4.8)
LYMPHOCYTES NFR BLD AUTO: 6.2 %
MCH RBC QN AUTO: 26.5 PG (ref 26–34)
MCHC RBC AUTO-ENTMCNC: 31.6 G/DL (ref 32–36)
MCV RBC AUTO: 84 FL (ref 80–100)
MONOCYTES # BLD AUTO: 0.13 X10*3/UL (ref 0.1–1)
MONOCYTES NFR BLD AUTO: 1 %
NEUTROPHILS # BLD AUTO: 11.78 X10*3/UL (ref 1.2–7.7)
NEUTROPHILS NFR BLD AUTO: 91.7 %
NRBC BLD-RTO: 0 /100 WBCS (ref 0–0)
P AXIS: 69 DEGREES
PLATELET # BLD AUTO: 471 X10*3/UL (ref 150–450)
POTASSIUM SERPL-SCNC: 4.4 MMOL/L (ref 3.5–5.3)
PR INTERVAL: 137 MS
PROT SERPL-MCNC: 7.2 G/DL (ref 6.4–8.2)
Q ONSET: 254 MS
QRS COUNT: 16 BEATS
QRS DURATION: 98 MS
QT INTERVAL: 362 MS
QTC CALCULATION(BAZETT): 455 MS
QTC FREDERICIA: 421 MS
R AXIS: 53 DEGREES
RBC # BLD AUTO: 4.42 X10*6/UL (ref 4–5.2)
SODIUM SERPL-SCNC: 136 MMOL/L (ref 136–145)
T AXIS: 53 DEGREES
T OFFSET: 435 MS
VENTRICULAR RATE: 95 BPM
WBC # BLD AUTO: 12.8 X10*3/UL (ref 4.4–11.3)

## 2025-03-19 PROCEDURE — 94640 AIRWAY INHALATION TREATMENT: CPT

## 2025-03-19 PROCEDURE — 88304 TISSUE EXAM BY PATHOLOGIST: CPT | Performed by: PATHOLOGY

## 2025-03-19 PROCEDURE — 85025 COMPLETE CBC W/AUTO DIFF WBC: CPT | Performed by: INTERNAL MEDICINE

## 2025-03-19 PROCEDURE — 87102 FUNGUS ISOLATION CULTURE: CPT | Mod: PARLAB | Performed by: INTERNAL MEDICINE

## 2025-03-19 PROCEDURE — 87075 CULTR BACTERIA EXCEPT BLOOD: CPT | Mod: PARLAB | Performed by: INTERNAL MEDICINE

## 2025-03-19 PROCEDURE — 2500000004 HC RX 250 GENERAL PHARMACY W/ HCPCS (ALT 636 FOR OP/ED): Performed by: ANESTHESIOLOGIST ASSISTANT

## 2025-03-19 PROCEDURE — 88304 TISSUE EXAM BY PATHOLOGIST: CPT | Mod: TC,PARLAB | Performed by: INTERNAL MEDICINE

## 2025-03-19 PROCEDURE — 31624 DX BRONCHOSCOPE/LAVAGE: CPT | Performed by: INTERNAL MEDICINE

## 2025-03-19 PROCEDURE — 87206 SMEAR FLUORESCENT/ACID STAI: CPT | Mod: PARLAB | Performed by: INTERNAL MEDICINE

## 2025-03-19 PROCEDURE — 71045 X-RAY EXAM CHEST 1 VIEW: CPT | Performed by: RADIOLOGY

## 2025-03-19 PROCEDURE — 94660 CPAP INITIATION&MGMT: CPT

## 2025-03-19 PROCEDURE — 2720000007 HC OR 272 NO HCPCS: Performed by: INTERNAL MEDICINE

## 2025-03-19 PROCEDURE — 31645 BRNCHSC W/THER ASPIR 1ST: CPT | Performed by: INTERNAL MEDICINE

## 2025-03-19 PROCEDURE — 76000 FLUOROSCOPY <1 HR PHYS/QHP: CPT

## 2025-03-19 PROCEDURE — 2500000002 HC RX 250 W HCPCS SELF ADMINISTERED DRUGS (ALT 637 FOR MEDICARE OP, ALT 636 FOR OP/ED): Performed by: EMERGENCY MEDICINE

## 2025-03-19 PROCEDURE — 2500000001 HC RX 250 WO HCPCS SELF ADMINISTERED DRUGS (ALT 637 FOR MEDICARE OP): Performed by: NURSE PRACTITIONER

## 2025-03-19 PROCEDURE — 80053 COMPREHEN METABOLIC PANEL: CPT | Performed by: INTERNAL MEDICINE

## 2025-03-19 PROCEDURE — 7100000002 HC RECOVERY ROOM TIME - EACH INCREMENTAL 1 MINUTE: Performed by: INTERNAL MEDICINE

## 2025-03-19 PROCEDURE — 2500000001 HC RX 250 WO HCPCS SELF ADMINISTERED DRUGS (ALT 637 FOR MEDICARE OP)

## 2025-03-19 PROCEDURE — 36415 COLL VENOUS BLD VENIPUNCTURE: CPT | Performed by: INTERNAL MEDICINE

## 2025-03-19 PROCEDURE — 71045 X-RAY EXAM CHEST 1 VIEW: CPT

## 2025-03-19 PROCEDURE — 31628 BRONCHOSCOPY/LUNG BX EACH: CPT | Performed by: INTERNAL MEDICINE

## 2025-03-19 PROCEDURE — 0B9D8ZX DRAINAGE OF RIGHT MIDDLE LUNG LOBE, VIA NATURAL OR ARTIFICIAL OPENING ENDOSCOPIC, DIAGNOSTIC: ICD-10-PCS | Performed by: INTERNAL MEDICINE

## 2025-03-19 PROCEDURE — 31623 DX BRONCHOSCOPE/BRUSH: CPT | Performed by: INTERNAL MEDICINE

## 2025-03-19 PROCEDURE — A31645 PR BRONCHOSCOPY,RX ASPIR PULM TREE: Performed by: ANESTHESIOLOGY

## 2025-03-19 PROCEDURE — 0BD58ZX EXTRACTION OF RIGHT MIDDLE LOBE BRONCHUS, VIA NATURAL OR ARTIFICIAL OPENING ENDOSCOPIC, DIAGNOSTIC: ICD-10-PCS | Performed by: INTERNAL MEDICINE

## 2025-03-19 PROCEDURE — 3700000001 HC GENERAL ANESTHESIA TIME - INITIAL BASE CHARGE: Performed by: INTERNAL MEDICINE

## 2025-03-19 PROCEDURE — 7100000001 HC RECOVERY ROOM TIME - INITIAL BASE CHARGE: Performed by: INTERNAL MEDICINE

## 2025-03-19 PROCEDURE — A31645 PR BRONCHOSCOPY,RX ASPIR PULM TREE: Performed by: ANESTHESIOLOGIST ASSISTANT

## 2025-03-19 PROCEDURE — 2500000005 HC RX 250 GENERAL PHARMACY W/O HCPCS: Performed by: NURSE PRACTITIONER

## 2025-03-19 PROCEDURE — 1200000002 HC GENERAL ROOM WITH TELEMETRY DAILY

## 2025-03-19 PROCEDURE — 2500000004 HC RX 250 GENERAL PHARMACY W/ HCPCS (ALT 636 FOR OP/ED): Performed by: NURSE PRACTITIONER

## 2025-03-19 PROCEDURE — 87070 CULTURE OTHR SPECIMN AEROBIC: CPT | Mod: PARLAB | Performed by: INTERNAL MEDICINE

## 2025-03-19 PROCEDURE — 0BC58ZZ EXTIRPATION OF MATTER FROM RIGHT MIDDLE LOBE BRONCHUS, VIA NATURAL OR ARTIFICIAL OPENING ENDOSCOPIC: ICD-10-PCS | Performed by: INTERNAL MEDICINE

## 2025-03-19 PROCEDURE — 3700000002 HC GENERAL ANESTHESIA TIME - EACH INCREMENTAL 1 MINUTE: Performed by: INTERNAL MEDICINE

## 2025-03-19 RX ORDER — ONDANSETRON HYDROCHLORIDE 2 MG/ML
4 INJECTION, SOLUTION INTRAVENOUS ONCE AS NEEDED
Status: DISCONTINUED | OUTPATIENT
Start: 2025-03-19 | End: 2025-03-20 | Stop reason: HOSPADM

## 2025-03-19 RX ORDER — SODIUM CHLORIDE 0.9 % (FLUSH) 0.9 %
SYRINGE (ML) INJECTION AS NEEDED
Status: DISCONTINUED | OUTPATIENT
Start: 2025-03-19 | End: 2025-03-19

## 2025-03-19 RX ORDER — MEPERIDINE HYDROCHLORIDE 50 MG/ML
12.5 INJECTION INTRAMUSCULAR; INTRAVENOUS; SUBCUTANEOUS EVERY 10 MIN PRN
Status: DISCONTINUED | OUTPATIENT
Start: 2025-03-19 | End: 2025-03-20 | Stop reason: HOSPADM

## 2025-03-19 RX ORDER — LIDOCAINE HCL/PF 100 MG/5ML
SYRINGE (ML) INTRAVENOUS AS NEEDED
Status: DISCONTINUED | OUTPATIENT
Start: 2025-03-19 | End: 2025-03-19

## 2025-03-19 RX ORDER — PROPOFOL 10 MG/ML
INJECTION, EMULSION INTRAVENOUS AS NEEDED
Status: DISCONTINUED | OUTPATIENT
Start: 2025-03-19 | End: 2025-03-19

## 2025-03-19 RX ADMIN — CEFTRIAXONE SODIUM 2 G: 2 INJECTION, SOLUTION INTRAVENOUS at 17:32

## 2025-03-19 RX ADMIN — LIDOCAINE HYDROCHLORIDE 100 MG: 20 INJECTION, SOLUTION INTRAVENOUS at 13:25

## 2025-03-19 RX ADMIN — FLUTICASONE FUROATE AND VILANTEROL TRIFENATATE 1 PUFF: 200; 25 POWDER RESPIRATORY (INHALATION) at 06:56

## 2025-03-19 RX ADMIN — PROPOFOL 40 MG: 10 INJECTION, EMULSION INTRAVENOUS at 13:29

## 2025-03-19 RX ADMIN — PROPOFOL 80 MG: 10 INJECTION, EMULSION INTRAVENOUS at 13:25

## 2025-03-19 RX ADMIN — IPRATROPIUM BROMIDE AND ALBUTEROL SULFATE 3 ML: .5; 3 SOLUTION RESPIRATORY (INHALATION) at 06:54

## 2025-03-19 RX ADMIN — IPRATROPIUM BROMIDE AND ALBUTEROL SULFATE 3 ML: .5; 3 SOLUTION RESPIRATORY (INHALATION) at 20:02

## 2025-03-19 RX ADMIN — Medication 21 PERCENT: at 06:55

## 2025-03-19 RX ADMIN — METHYLPREDNISOLONE SODIUM SUCCINATE 40 MG: 40 INJECTION, POWDER, FOR SOLUTION INTRAMUSCULAR; INTRAVENOUS at 01:05

## 2025-03-19 RX ADMIN — MONTELUKAST 10 MG: 10 TABLET, FILM COATED ORAL at 20:21

## 2025-03-19 RX ADMIN — PROPOFOL 50 MG: 10 INJECTION, EMULSION INTRAVENOUS at 13:42

## 2025-03-19 RX ADMIN — Medication 10 ML: at 13:55

## 2025-03-19 RX ADMIN — IPRATROPIUM BROMIDE AND ALBUTEROL SULFATE 3 ML: .5; 3 SOLUTION RESPIRATORY (INHALATION) at 14:23

## 2025-03-19 RX ADMIN — BENZONATATE 100 MG: 100 CAPSULE ORAL at 01:07

## 2025-03-19 RX ADMIN — PROPOFOL 125 MCG/KG/MIN: 10 INJECTION, EMULSION INTRAVENOUS at 13:26

## 2025-03-19 RX ADMIN — AZITHROMYCIN MONOHYDRATE 500 MG: 500 INJECTION, POWDER, LYOPHILIZED, FOR SOLUTION INTRAVENOUS at 16:05

## 2025-03-19 SDOH — HEALTH STABILITY: MENTAL HEALTH: CURRENT SMOKER: 0

## 2025-03-19 ASSESSMENT — PAIN SCALES - GENERAL
PAIN_LEVEL: 0
PAINLEVEL_OUTOF10: 0 - NO PAIN

## 2025-03-19 ASSESSMENT — PAIN - FUNCTIONAL ASSESSMENT: PAIN_FUNCTIONAL_ASSESSMENT: 0-10

## 2025-03-19 ASSESSMENT — COGNITIVE AND FUNCTIONAL STATUS - GENERAL
DAILY ACTIVITIY SCORE: 24
MOBILITY SCORE: 24

## 2025-03-19 ASSESSMENT — COLUMBIA-SUICIDE SEVERITY RATING SCALE - C-SSRS
6. HAVE YOU EVER DONE ANYTHING, STARTED TO DO ANYTHING, OR PREPARED TO DO ANYTHING TO END YOUR LIFE?: NO
1. IN THE PAST MONTH, HAVE YOU WISHED YOU WERE DEAD OR WISHED YOU COULD GO TO SLEEP AND NOT WAKE UP?: NO
2. HAVE YOU ACTUALLY HAD ANY THOUGHTS OF KILLING YOURSELF?: NO

## 2025-03-19 NOTE — DOCUMENTATION CLARIFICATION NOTE
"    PATIENT:               SAVANNAH MOSHER  ACCT #:                  [id number]  MRN:                       [id number]  :                       1955  ADMIT DATE:       3/16/2025 9:11 AM  DISCH DATE:  RESPONDING PROVIDER #:        [id number]          PROVIDER RESPONSE TEXT:    Acute Hypoxic Respiratory Failure ruled out after further workup    CDI QUERY TEXT:    Clarification    Instruction:    Based on your assessment of the patient and the clinical information, please provide the requested documentation by clicking on the appropriate radio button and enter any additional information if prompted.    Question: Please clarify diagnosis of respiratory failure as    When answering this query, please exercise your independent professional judgment. The fact that a question is being asked, does not imply that any particular answer is desired or expected.    The patient's clinical indicators include:  Clinical Information:76-year-old female presenting with chest pain which is right-sided.    Documented Diagnosis: Acute hypoxic respiratory failure per 3/18 Pulmonology Note    Clinical Indicators and Documentation:  -Vital Signs: T 36.8 HR 84, R 24, /61, SPO2 93-95% on Room Air per flowsheets    -Physical Exam Findings:  3/16 H&P: .  \"She reports that she feels shortness of breath at rest and with exertion.\"    -Oxygen Therapy: none    -3/18 Pulmonology Consult Note: \"recently by her pulmonologist send she was identified to have right middle lobe infiltrate which has been waxing and waning, presented with increased shortness of breath, coughing, progressive weight loss, and the need for oxygen, her chest CT showed reemergence of right middle lobe atelectasis  Acute hypoxic respiratory failure  Recurrent right middle lobe infiltrate  Right lower lobe atelectasis  Bronchiectasis\"    3/18 Progress Note:\" Pneumonia due to infectious organism, unspecified laterality, unspecified part of lung\"    Treatment: Meds: " Nebs    Risk Factors: Pneumonia, Hx. Asthma  Options provided:  -- Acute Hypoxic Respiratory Failure ruled out after further workup  -- Acute Respiratory Failure as evidenced by, Please specify additional information below  -- Other - I will add my own diagnosis  -- Refer to Clinical Documentation Reviewer    Query created by: Korina Glass on 3/19/2025 12:13 PM      Electronically signed by:  PRABHJOT SHARIF MD 3/19/2025 5:10 PM

## 2025-03-19 NOTE — ANESTHESIA PREPROCEDURE EVALUATION
Patient: Nicolle Campbell    Procedure Information       Date/Time: 03/19/25 1300    Scheduled providers: Waylon Roman MD; Lala Frausto MD    Procedure: BRONCHOSCOPY    Location: [facility]            Relevant Problems   Anesthesia (within normal limits)      Pulmonary   (+) AB (asthmatic bronchitis) ([facility]-HCC)   (+) Asthma, moderate persistent ([facility]-HCC)   (+) Pneumonia   (+) Pneumonia due to infectious organism, unspecified laterality, unspecified part of lung   (+) Pneumonia of right middle lobe due to infectious organism      /Renal   (+) UTI (urinary tract infection)      HEENT   (+) Hearing impairment   (+) Hearing loss, bilateral      ID   (+) Acute URI   (+) Pneumonia   (+) Pneumonia due to infectious organism, unspecified laterality, unspecified part of lung   (+) Pneumonia of right middle lobe due to infectious organism   (+) UTI (urinary tract infection)       Clinical information reviewed:    Allergies  Meds               NPO Detail:  NPO/Void Status  Carbohydrate Drink Given Prior to Surgery? : N  Date of Last Liquid: 03/19/25  Time of Last Liquid: 0730 (two sips of tea with cream)  Date of Last Solid: 03/18/25  Time of Last Solid: 1900  Last Intake Type: Solid meal  Time of Last Void: 1201         Physical Exam    Airway  Mallampati: II  TM distance: >3 FB  Neck ROM: full     Cardiovascular - normal exam     Dental - normal exam     Pulmonary - normal exam     Abdominal - normal exam             Anesthesia Plan    History of general anesthesia?: yes  History of complications of general anesthesia?: no    ASA 3     MAC     The patient is not a current smoker.    intravenous induction   Anesthetic plan and risks discussed with patient.    Plan discussed with CAA.

## 2025-03-19 NOTE — CARE PLAN
The patient's goals for the shift include      The clinical goals for the shift include Maintain safety and free from respiratory distress      Problem: Safety - Adult  Goal: Free from fall injury  Outcome: Progressing  Flowsheets (Taken 3/19/2025 0530)  Free from fall injury: Instruct family/caregiver on patient safety     Problem: Respiratory  Goal: Clear secretions with interventions this shift  Outcome: Progressing  Flowsheets (Taken 3/19/2025 0530)  Clear secretions with interventions this shift:   Incentive spirometry   Med administration/monitoring of effect     Problem: Respiratory  Goal: Minimize anxiety/maximize coping throughout shift  Outcome: Progressing

## 2025-03-19 NOTE — CONSULTS
"Nutrition Initial Assessment:   Nutrition Assessment    Reason for Assessment: Admission nursing screening    Patient is a 69 y.o. female presenting with pneumonia due to infectious organism.       Nutrition History:  Energy Intake: Fair 50-75 %  Pain affecting nutrition status: N/A  Food and Nutrient History: Pt reports decreased appetite and PO intake since Sunday (~3-4 days). Before this, she was eating well. Does not drink any ONS but does drink milk frequently. Has several allergies that limit ONS options here- corn, wheat, and beans. Reports some nausea since admission but no other GI symptoms. Reports dry mouth which makes swallowing difficult at times. Pt states family can bring in food from home when she is no longer NPO as menu items here are likely fairly limited due to allergens.  Vitamin/Herbal Supplement Use: none listed in home med list  Food Allergy: Gluten/wheat, Other (Comment) (corn and beans, per pt)       Anthropometrics:  Height: 167.6 cm (5' 6\")   Weight: 49.9 kg (110 lb)   BMI (Calculated): 17.76  IBW/kg (Dietitian Calculated): 59.1 kg          Weight History:   Wt Readings from Last 10 Encounters:   03/19/25 49.9 kg (110 lb)   02/17/25 49.9 kg (110 lb)   11/30/24 49.9 kg (110 lb)   11/13/24 52.2 kg (115 lb)   12/20/22 50.8 kg (112 lb)   11/28/22 50.8 kg (112 lb)   10/20/22 50.8 kg (112 lb)   09/02/22 50.8 kg (112 lb)   06/13/22 50.8 kg (112 lb)   12/06/21 50.8 kg (112 lb)      Weight Change %:  Weight History / % Weight Change: Pt reports weight has been stable at ~110#. Based on available wt records in EMR, pt with 2.3 kg (4.4%) wt loss x 4 months. Currently underweight based on BMI of 17.7 kg/m2.  Significant Weight Loss: No    Nutrition Focused Physical Exam Findings:    Subcutaneous Fat Loss:   Orbital Fat Pads: Well nourished (slightly bulging fat pads)  Buccal Fat Pads: Well nourished (full, rounded cheeks)  Triceps: Well nourished (ample fat tissue)  Muscle Wasting:  Temporalis: Well " nourished (well-defined muscle)  Pectoralis (Clavicular Region): Mild-Moderate (some protrusion of clavicle)  Deltoid/Trapezius: Mild-Moderate (slight protrusion of acromion process)  Interosseous: Well nourished (muscle bulges)  Edema:  Edema: none  Physical Findings:  Skin: Negative  Digestive System Findings: Nausea  Mouth Findings: Xerostomia    Nutrition Significant Labs:    Reviewed   Nutrition Specific Medications:  Reviewed     I/O:   Last BM Date: 03/19/25;      Dietary Orders (From admission, onward)       Start     Ordered    03/19/25 0001  NPO Diet Except: Other (specify); Additional Details: Clear liquids until 0500. May have clears up to 2 hours prior to procedure if exact time is known.; Effective midnight  Diet effective midnight        Question Answer Comment   Except: Other (specify)    Additional Details Clear liquids until 0500. May have clears up to 2 hours prior to procedure if exact time is known.        03/18/25 1836    03/17/25 1329  May Participate in Room Service  ( ROOM SERVICE MAY PARTICIPATE)  Once        Question:  .  Answer:  Yes    03/17/25 1328                     Estimated Needs:   Total Energy Estimated Needs in 24 hours (kCal): 1500 kCal  Method for Estimating Needs: 30 kcal/kg ABW  Total Protein Estimated Needs in 24 Hours (g): 60 g  Method for Estimating 24 Hour Protein Needs: 1.2 g/kg ABW  Total Fluid Estimated Needs in 24 Hours (mL): 1500 mL  Method for Estimating 24 Hour Fluid Needs: 1 ml/kcal or per MD  Patient on Order Fluid Restriction: No        Nutrition Diagnosis   Malnutrition Diagnosis  Patient has Malnutrition Diagnosis: No    Nutrition Diagnosis  Patient has Nutrition Diagnosis: Yes  Diagnosis Status (1): New  Nutrition Diagnosis 1: Inadequate oral intake  Related to (1): decreased appetite and medical status  As Evidenced by (1): NPO for multiple meals; reports of eating less during hospitalization       Nutrition Interventions/Recommendations   Nutrition  prescription for oral nutrition    Nutrition Recommendations:  Individualized Nutrition Prescription Provided for : Regular diet when able to advance. No ONS options available at this facility that do not contain pt's allergens. Will encourage intake of milk/yogurt/other dairy as pt says she consumes these items frequently and can be a concentrated source of calories and protein.    Nutrition Interventions/Goals:   Interventions: Meals and snacks  Meals and Snacks: General healthful diet  Goal: Consumes 3 meals per day      Education Documentation  No documentation found.     Patient with no diet related questions at this time.         Nutrition Monitoring and Evaluation   Food/Nutrient Related History Monitoring  Monitoring and Evaluation Plan: Intake / amount of food, Estimated Energy Intake  Estimated Energy Intake: Energy intake 50 -75% of estimated energy needs  Intake / Amount of food: Consumes at least 50% or more of meals/snacks/supplements    Anthropometric Measurements  Monitoring and Evaluation Plan: Body weight  Body Weight: Body weight - Maintain stable weight         Physical Exam Findings  Monitoring and Evaluation Plan: Digestive System  Digestive System Finding: Nausea  Criteria: Improvement in GI function/symptoms    Goal Status: New goal(s) identified    Time Spent (min): 60 minutes

## 2025-03-19 NOTE — ANESTHESIA POSTPROCEDURE EVALUATION
Patient: Nicolle Campbell    Procedure Summary       Date: 03/19/25 Room / Location: [facility]    Anesthesia Start: 1320 Anesthesia Stop: 1359    Procedure: BRONCHOSCOPY Diagnosis:       Right middle lobe syndrome      Collapse of right lung    Scheduled Providers: Waylon Roman MD; Lala Frausto MD Responsible Provider: Lala Frausto MD    Anesthesia Type: MAC ASA Status: 3            Anesthesia Type: MAC    Vitals Value Taken Time   /94 03/19/25 1410   Temp 36.3 °C (97.3 °F) 03/19/25 1358   Pulse 100 03/19/25 1410   Resp 16 03/19/25 1410   SpO2 100 % 03/19/25 1358       Anesthesia Post Evaluation    Patient location during evaluation: PACU  Patient participation: complete - patient participated  Level of consciousness: awake and alert  Pain score: 0  Pain management: adequate  Airway patency: patent  Cardiovascular status: acceptable and hemodynamically stable  Respiratory status: acceptable, spontaneous ventilation and nasal cannula  Hydration status: acceptable  Postoperative Nausea and Vomiting: none        There were no known notable events for this encounter.

## 2025-03-19 NOTE — CARE PLAN
The patient's goals for the shift include to get some rest.     The clinical goals for the shift include safety and comfort    Over the shift, the patient did make progress toward the following goals.

## 2025-03-20 VITALS
OXYGEN SATURATION: 97 % | HEIGHT: 66 IN | HEART RATE: 86 BPM | BODY MASS INDEX: 17.68 KG/M2 | TEMPERATURE: 97.7 F | RESPIRATION RATE: 20 BRPM | SYSTOLIC BLOOD PRESSURE: 141 MMHG | WEIGHT: 110 LBS | DIASTOLIC BLOOD PRESSURE: 71 MMHG

## 2025-03-20 LAB
ALBUMIN SERPL BCP-MCNC: 3.8 G/DL (ref 3.4–5)
ALP SERPL-CCNC: 94 U/L (ref 33–136)
ALT SERPL W P-5'-P-CCNC: 17 U/L (ref 7–45)
ANION GAP SERPL CALC-SCNC: 12 MMOL/L (ref 10–20)
AST SERPL W P-5'-P-CCNC: 15 U/L (ref 9–39)
BASOPHILS # BLD AUTO: 0.03 X10*3/UL (ref 0–0.1)
BASOPHILS NFR BLD AUTO: 0.2 %
BILIRUB SERPL-MCNC: 0.3 MG/DL (ref 0–1.2)
BUN SERPL-MCNC: 22 MG/DL (ref 6–23)
CALCIUM SERPL-MCNC: 9.1 MG/DL (ref 8.6–10.3)
CHLORIDE SERPL-SCNC: 103 MMOL/L (ref 98–107)
CO2 SERPL-SCNC: 24 MMOL/L (ref 21–32)
CREAT SERPL-MCNC: 0.7 MG/DL (ref 0.5–1.05)
EGFRCR SERPLBLD CKD-EPI 2021: >90 ML/MIN/1.73M*2
EOSINOPHIL # BLD AUTO: 0.02 X10*3/UL (ref 0–0.7)
EOSINOPHIL NFR BLD AUTO: 0.2 %
ERYTHROCYTE [DISTWIDTH] IN BLOOD BY AUTOMATED COUNT: 14.3 % (ref 11.5–14.5)
GLUCOSE SERPL-MCNC: 166 MG/DL (ref 74–99)
HCT VFR BLD AUTO: 36.4 % (ref 36–46)
HGB BLD-MCNC: 11.2 G/DL (ref 12–16)
IMM GRANULOCYTES # BLD AUTO: 0.09 X10*3/UL (ref 0–0.7)
IMM GRANULOCYTES NFR BLD AUTO: 0.7 % (ref 0–0.9)
LYMPHOCYTES # BLD AUTO: 0.87 X10*3/UL (ref 1.2–4.8)
LYMPHOCYTES NFR BLD AUTO: 7 %
MCH RBC QN AUTO: 26.4 PG (ref 26–34)
MCHC RBC AUTO-ENTMCNC: 30.8 G/DL (ref 32–36)
MCV RBC AUTO: 86 FL (ref 80–100)
MONOCYTES # BLD AUTO: 0.15 X10*3/UL (ref 0.1–1)
MONOCYTES NFR BLD AUTO: 1.2 %
NEUTROPHILS # BLD AUTO: 11.28 X10*3/UL (ref 1.2–7.7)
NEUTROPHILS NFR BLD AUTO: 90.7 %
NRBC BLD-RTO: 0 /100 WBCS (ref 0–0)
PLATELET # BLD AUTO: 494 X10*3/UL (ref 150–450)
POTASSIUM SERPL-SCNC: 4.1 MMOL/L (ref 3.5–5.3)
PROT SERPL-MCNC: 7 G/DL (ref 6.4–8.2)
RBC # BLD AUTO: 4.25 X10*6/UL (ref 4–5.2)
SODIUM SERPL-SCNC: 135 MMOL/L (ref 136–145)
WBC # BLD AUTO: 12.4 X10*3/UL (ref 4.4–11.3)

## 2025-03-20 PROCEDURE — 84075 ASSAY ALKALINE PHOSPHATASE: CPT | Performed by: INTERNAL MEDICINE

## 2025-03-20 PROCEDURE — 2500000004 HC RX 250 GENERAL PHARMACY W/ HCPCS (ALT 636 FOR OP/ED): Mod: JZ | Performed by: NURSE PRACTITIONER

## 2025-03-20 PROCEDURE — 36415 COLL VENOUS BLD VENIPUNCTURE: CPT | Performed by: INTERNAL MEDICINE

## 2025-03-20 PROCEDURE — 94640 AIRWAY INHALATION TREATMENT: CPT

## 2025-03-20 PROCEDURE — 2500000002 HC RX 250 W HCPCS SELF ADMINISTERED DRUGS (ALT 637 FOR MEDICARE OP, ALT 636 FOR OP/ED): Performed by: EMERGENCY MEDICINE

## 2025-03-20 PROCEDURE — 85025 COMPLETE CBC W/AUTO DIFF WBC: CPT | Performed by: INTERNAL MEDICINE

## 2025-03-20 PROCEDURE — 2500000001 HC RX 250 WO HCPCS SELF ADMINISTERED DRUGS (ALT 637 FOR MEDICARE OP): Performed by: NURSE PRACTITIONER

## 2025-03-20 RX ORDER — CLINDAMYCIN HYDROCHLORIDE 300 MG/1
300 CAPSULE ORAL 4 TIMES DAILY
Qty: 40 CAPSULE | Refills: 0 | Status: SHIPPED | OUTPATIENT
Start: 2025-03-20 | End: 2025-03-30

## 2025-03-20 RX ORDER — FLUTICASONE PROPIONATE AND SALMETEROL 250; 50 UG/1; UG/1
1 POWDER RESPIRATORY (INHALATION)
Qty: 1 EACH | Refills: 0 | Status: SHIPPED | OUTPATIENT
Start: 2025-03-20

## 2025-03-20 RX ADMIN — METHYLPREDNISOLONE SODIUM SUCCINATE 40 MG: 40 INJECTION, POWDER, FOR SOLUTION INTRAMUSCULAR; INTRAVENOUS at 00:26

## 2025-03-20 RX ADMIN — ACETAMINOPHEN 650 MG: 325 TABLET, FILM COATED ORAL at 09:04

## 2025-03-20 RX ADMIN — FLUTICASONE FUROATE AND VILANTEROL TRIFENATATE 1 PUFF: 200; 25 POWDER RESPIRATORY (INHALATION) at 07:04

## 2025-03-20 RX ADMIN — IPRATROPIUM BROMIDE AND ALBUTEROL SULFATE 3 ML: .5; 3 SOLUTION RESPIRATORY (INHALATION) at 11:44

## 2025-03-20 RX ADMIN — IPRATROPIUM BROMIDE AND ALBUTEROL SULFATE 3 ML: .5; 3 SOLUTION RESPIRATORY (INHALATION) at 07:02

## 2025-03-20 ASSESSMENT — PAIN - FUNCTIONAL ASSESSMENT
PAIN_FUNCTIONAL_ASSESSMENT: 0-10

## 2025-03-20 ASSESSMENT — PAIN SCALES - GENERAL
PAINLEVEL_OUTOF10: 1
PAINLEVEL_OUTOF10: 3
PAINLEVEL_OUTOF10: 0 - NO PAIN

## 2025-03-20 NOTE — PROGRESS NOTES
3/20/2025  Followed up with pt and spouse in room, introduced self and explained role.  Pt is from home, lives with spouse.  Independent.  (Pt ambulating in [place] with spouse earlier).  Pt does not feel she will have any needs upon discharge.  Ct Team will follow.   Buffy Acosta Rn [facility]

## 2025-03-20 NOTE — DISCHARGE SUMMARY
Discharge Diagnosis  Pneumonia due to infectious organism, unspecified laterality, unspecified part of lung    Issues Requiring Follow-Up      for   Assessment & Plan  Dyspnea    Pneumonia    Pneumonia due to infectious organism, unspecified laterality, unspecified part of lung      Patient with significant clinical improvement noted, patient medically cleared for discharge today. Patient seen resting in bed with head of bed elevated, no s/s or c/o acute difficulties at this time. Vital signs for last 24 hours:  Temp:  [35.8 °C (96.4 °F)-36.8 °C (98.2 °F)] 36.5 °C (97.7 °F)  Heart Rate:  [] 77  BP: (117-136)/(57-66) 136/66 Medications and labs reviewed-   Results for orders placed or performed during the hospital encounter of 03/16/25 (from the past 24 hours)   CBC and Auto Differential   Result Value Ref Range    WBC 12.4 (H) 4.4 - 11.3 x10*3/uL    nRBC 0.0 0.0 - 0.0 /100 WBCs    RBC 4.25 4.00 - 5.20 x10*6/uL    Hemoglobin 11.2 (L) 12.0 - 16.0 g/dL    Hematocrit 36.4 36.0 - 46.0 %    MCV 86 80 - 100 fL    MCH 26.4 26.0 - 34.0 pg    MCHC 30.8 (L) 32.0 - 36.0 g/dL    RDW 14.3 11.5 - 14.5 %    Platelets 494 (H) 150 - 450 x10*3/uL    Neutrophils % 90.7 40.0 - 80.0 %    Immature Granulocytes %, Automated 0.7 0.0 - 0.9 %    Lymphocytes % 7.0 13.0 - 44.0 %    Monocytes % 1.2 2.0 - 10.0 %    Eosinophils % 0.2 0.0 - 6.0 %    Basophils % 0.2 0.0 - 2.0 %    Neutrophils Absolute 11.28 (H) 1.20 - 7.70 x10*3/uL    Immature Granulocytes Absolute, Automated 0.09 0.00 - 0.70 x10*3/uL    Lymphocytes Absolute 0.87 (L) 1.20 - 4.80 x10*3/uL    Monocytes Absolute 0.15 0.10 - 1.00 x10*3/uL    Eosinophils Absolute 0.02 0.00 - 0.70 x10*3/uL    Basophils Absolute 0.03 0.00 - 0.10 x10*3/uL   Comprehensive Metabolic Panel   Result Value Ref Range    Glucose 166 (H) 74 - 99 mg/dL    Sodium 135 (L) 136 - 145 mmol/L    Potassium 4.1 3.5 - 5.3 mmol/L    Chloride 103 98 - 107 mmol/L    Bicarbonate 24 21 - 32 mmol/L    Anion Gap 12 10 - 20  mmol/L    Urea Nitrogen 22 6 - 23 mg/dL    Creatinine 0.70 0.50 - 1.05 mg/dL    eGFR >90 >60 mL/min/1.73m*2    Calcium 9.1 8.6 - 10.3 mg/dL    Albumin 3.8 3.4 - 5.0 g/dL    Alkaline Phosphatase 94 33 - 136 U/L    Total Protein 7.0 6.4 - 8.2 g/dL    AST 15 9 - 39 U/L    Bilirubin, Total 0.3 0.0 - 1.2 mg/dL    ALT 17 7 - 45 U/L      Patient recently received an antibiotic (last 12 hours)       None           No results found for the last 90 days.     Patient was fully evaluated 3/20. Vitals were within normal limits. Pertinent labs today include WBC 12.4 , Hbg 11.2, and plts 494. CBC trending closer to normal ranges. Na 135, K 4.1, Cl 103, HCO3- 34, BUN 12, glucose 166. Patient discharged today following bronchoscopy. Patient instructed to follow up with pulm awaiting surgical pathology results from bx taken during bronchoscopy. Pt discharged on Azithromycin and Ceftriaxone.     Continue aggressive pulmonary hygiene and oral hygiene. Off loading as tolerated for skin integrity. Plan discussed with interdisciplinary team, ok to discharge, will continue current and repeat labs in the AM if patient still hospitalized. Patient aware and agreeable to current plan, continue plan as above.     I spent 30 minutes on the date of the service which included preparing to see the patient, face-to-face patient care, completing clinical documentation, obtaining and/or reviewing separately obtained history, performing a medically appropriate examination, counseling and educating the patient/family/caregiver, ordering medications, tests, or procedures, communicating with other HCPs (not separately reported), independently interpreting results (not separately reported), communicating results to the patient/family/caregiver, and care coordination (not separately reported    Discharge Meds     Medication List      START taking these medications     clindamycin 300 mg capsule; Commonly known as: Cleocin; Take 1 capsule   (300 mg) by mouth  4 times a day for 10 days.     CHANGE how you take these medications     ipratropium-albuteroL 0.5-2.5 mg/3 mL nebulizer solution; Commonly known   as: Duo-Neb; Use 3 times per day; What changed: how much to take, how to   take this, when to take this, reasons to take this     CONTINUE taking these medications     budesonide-formoterol 160-4.5 mcg/actuation inhaler; Commonly known as:   Symbicort; Inhale 2 puffs 2 times a day.   guaiFENesin 100 mg/5 mL syrup; Commonly known as: Robitussin   ibandronate 150 mg tablet; Commonly known as: Boniva; Take 1 tablet (150   mg) by mouth every 30 (thirty) days. Take in morning with full glass of   water on an empty stomach. No food, drink, meds, or lying down for 60   minutes after.   montelukast 10 mg tablet; Commonly known as: Singulair; Take 1 tablet   (10 mg) by mouth once daily at bedtime.   Proair Digihaler 90 mcg/actuation aero powdr breath act w/sensor   inhaler; Generic drug: albuterol sulfate; Inhale 2 puffs every 6 hours if   needed for wheezing.     STOP taking these medications     ciprofloxacin 250 mg tablet; Commonly known as: Cipro   fexofenadine-pseudoephedrine  mg 12 hr tablet; Commonly known as:   Allegra-D       Test Results Pending At Discharge  Pending Labs       Order Current Status    AFB Culture/Smear In process    AFB Culture/Smear In process    AFB Culture/Smear In process    Surgical Pathology Exam In process    Fungal Culture/Smear Preliminary result    Fungal Culture/Smear Preliminary result    Fungal Culture/Smear Preliminary result    Respiratory Culture/Smear Preliminary result    Respiratory Culture/Smear Preliminary result    Respiratory Culture/Smear Preliminary result            Hospital Course         Tyree Teixeira MD   Physician  Internal Medicine     Progress Notes     Signed     Date of Service: 3/18/2025  4:15 PM     Signed       Expand All Collapse All    Nicolle Campbell is a 69 y.o. female on day 2 of admission presenting  with Pneumonia due to infectious organism, unspecified laterality, unspecified part of lung.           Subjective        Tyree Teixeira MD   Physician  Internal Medicine     H&P     Signed     Date of Service: 3/17/2025  2:59 PM      Signed        Expand All Collapse All    History Of Present Illness  Nicolle Campbell is a 69 y.o. female  With a past medical history of asthma, pneumonia, right lung collapse, seasonal allergies who presents to [facility] for evaluation of shortness of breath.  Patient reports she went to see her pulmonologist and was given doxycycline for pneumonia which she completed.  She states she started feeling worse on Friday complaining of fatigue, decreased appetite, and shortness of breath which began around 4 AM this morning.  She denies any aggravating or alleviating factors.  She reports that she feels shortness of breath at rest and with exertion.  She is reporting a productive cough with yellow sputum.  She denies any fevers or chills.  She is reporting chest pain which worsens with cough and palpation.  No reports of nausea vomiting.     ED course: Afebrile, hemodynamically stable, tachypneic with respiratory rate 21-36.  Labs notable for glucose 110, sodium 135.  Troponin negative x 2.  D-dimer 720, platelet 452, eosinophils 1.23.  Flu A/COVID-negative.  CTA chest negative for PE.  Revealing endobronchial hypodensity with occlusion of the right middle lobe bronchus and occlusion of the posterior segmental branch of the right lower lobe bronchus.  Full impression to follow.  Cefepime, azithromycin, vancomycin, Solu-Medrol, Benadryl in ER.     10 point review of systems has been performed and is negative except what is stated above     Patient has been consult placed to pulmonology for further medical management      Past Medical History  Medical History           Past Medical History:   Diagnosis Date    Acute sinusitis, unspecified 07/19/2018     Acute sinusitis    Acute upper respiratory  infection, unspecified 07/19/2018     URI (upper respiratory infection)    Concussion without loss of consciousness, initial encounter 07/19/2018     Concussion w/o coma    Dizziness and giddiness 07/19/2018     Vertigo    Encounter for immunization 12/20/2022     Need for vaccination    Epistaxis 07/19/2018     Epistaxis    Frequency of micturition 07/19/2018     Urinary frequency    Headache 07/19/2018     Cephalgia    Laceration without foreign body, right lower leg, initial encounter 07/19/2018     Laceration of right lower extremity, initial encounter    Moderate persistent asthma, uncomplicated ([facility]) 07/19/2018     Asthma, moderate persistent    Other allergy status, other than to drugs and biological substances       Environmental allergies    Other malaise 07/19/2018     Malaise and fatigue    Personal history of other diseases of the respiratory system 02/01/2018     History of upper respiratory infection    Personal history of other diseases of the respiratory system 08/16/2021     History of acute sinusitis    Personal history of other diseases of the respiratory system 07/09/2015     History of asthma    Personal history of other specified conditions 07/09/2015     History of abnormal Pap smear    Unspecified fall, initial encounter 06/07/2016     Fall    Unspecified hearing loss, bilateral 07/19/2018     Hearing loss, bilateral    Unspecified otitis externa, left ear 03/19/2021     Left otitis externa    Urinary tract infection, site not specified 07/19/2018     UTI (urinary tract infection)            Surgical History  Surgical History             Past Surgical History:   Procedure Laterality Date    OTHER SURGICAL HISTORY   06/29/2021     Cataract surgery            Social History  She reports that she has never smoked. She has never used smokeless tobacco. She reports that she does not drink alcohol and does not use drugs.     Family History  Family History               Family History   Problem  "Relation Name Age of Onset    No Known Problems Mother        Diabetes Father        Diabetes Daughter                Allergies  Bee pollen, Corn, Penicillin, Prednisone, and Wheat        Physical Exam  Constitutional:       Appearance: She is normal weight.   HENT:      Nose: Nose normal.      Mouth/Throat:      Mouth: Mucous membranes are moist.      Pharynx: Oropharynx is clear.   Eyes:      Conjunctiva/sclera: Conjunctivae normal.   Cardiovascular:      Rate and Rhythm: Normal rate and regular rhythm.      Heart sounds: Normal heart sounds.   Pulmonary:      Effort: Pulmonary effort is normal.      Comments: Diminished bilateral bases  Abdominal:      General: Abdomen is flat.      Palpations: Abdomen is soft.   Musculoskeletal:         General: Normal range of motion.      Cervical back: Normal range of motion and neck supple.   Skin:     General: Skin is warm.   Neurological:      General: No focal deficit present.      Mental Status: She is alert.            Last Recorded Vitals  Blood pressure 109/58, pulse 91, temperature 36.8 °C (98.2 °F), temperature source Temporal, resp. rate (!) 30, height 1.676 m (5' 6\"), weight 49.9 kg (110 lb), SpO2 98%.     Scheduled medications     Scheduled Medications   azithromycin, 500 mg, intravenous, q24h  cefTRIAXone, 2 g, intravenous, q24h  enoxaparin, 40 mg, subcutaneous, Daily  fluticasone furoate-vilanteroL, 1 puff, inhalation, Daily  ipratropium-albuteroL, 3 mL, nebulization, TID  methylPREDNISolone sodium succinate (PF), 40 mg, intravenous, q24h  montelukast, 10 mg, oral, Nightly         Continuous medications  Continuous Medications          PRN medications  PRN Medications   PRN medications: acetaminophen, albuterol, benzonatate, guaiFENesin, oxygen      ECG 12 lead     Result Date: 3/17/2025  Sinus rhythm Atrial premature complex     CT angio chest for pulmonary embolism     Result Date: 3/16/2025  STUDY: CT Angiogram of the Chest; 3/16/2025 3:41 PM INDICATION: " Chest pain. COMPARISON: CT chest 3/16/2025. ACCESSION NUMBER(S): [id number] ORDERING CLINICIAN: KALA KELLY TECHNIQUE:  CTA of the chest was performed with intravenous contrast. Images are reviewed and processed at a workstation according to the CT angiogram protocol with 3-D and/or MIP post processing imaging generated.  Omnipaque 350 75 mL was administered intravenously. Automated mA/kV exposure control was utilized and patient examination was performed in strict accordance with principles of ALARA. FINDINGS: No central filling defect is seen within the main, lobar or proximal segmental pulmonary arteries to suggest an acute pulmonary embolism.. No thoracic aortic aneurysm, dissection or periaortic inflammation is seen.. Heart remains normal in size without pericardial or significant pleural effusion..  Multiple prominent mediastinal and right hilar lymph nodes are seen, possibly reactive however remain nonspecific and amenable to follow-up. There is no pleural effusion, pleural thickening, or pneumothorax. The trachea and right and left mainstem bronchi are patent. There is endobronchial hypodensity with occlusion of the right middle lobe bronchus and occlusion of the posterior segmental branch of the right lower lobe bronchus, series 506 image 154 and 176.. Again seen is consolidative airspace opacity in the right middle lobe with air bronchograms, not significantly changed in the interval since the recent comparison examination. Atelectasis and small amount of superposed infiltrate also seen at the right lung base, stable from the comparison examination. Stable biapical fibrosis is again seen.. A 6 mm enhancing focus is seen in the right posterior hepatic segment, series 501 image 284, possibly a flash filling hemangioma however remains nonspecific on a single phase CT. There are no acute fractures.  No suspicious bony lesions.     1. No central pulmonary embolism, aortic aneurysm or dissection. 2.  Endobronchial hypodensity with occlusion of the right middle lobe bronchus and occlusion of the posterior segmental branch of the right lower lobe bronchus. Again seen is consolidative airspace opacity in the right middle lobe with air bronchograms, not significantly changed in the interval since the recent comparison examination. Follow-up can document resolution to exclude any underlying pathology. 3. Prominent mediastinal and right hilar lymph nodes, possibly reactive however remain nonspecific and amenable to follow-up. Signed by Foster Watson MD     CT chest wo IV contrast     Result Date: 3/16/2025  STUDY: CT Chest without IV Contrast; 03/16/2025, 2:31 PM INDICATION: Cough. COMPARISON: CT chest 01/22/2025,11/30/2024. ACCESSION NUMBER(S): [id number] ORDERING CLINICIAN: KALA KELLY TECHNIQUE:  CT of the chest was performed without contrast. Automated mA/kV exposure control was utilized and patient examination was performed in strict accordance with principles of ALARA. FINDINGS: MEDIASTINUM: The heart is normal in size without pericardial effusion.  Coronary artery calcifications are identified.  LUNGS/PLEURA: There is no pleural effusion, pleural thickening, or pneumothorax. The airways are patent. There is a dense consolidating infiltrate in the right middle lobe. There is a small infiltrate posteriorly in the right lower lobe. LYMPH NODES: Thoracic lymph nodes are not enlarged. UPPER ABDOMEN: Upper abdomen demonstrates no acute pathology. BONES: There are no acute fractures.  No suspicious bony lesions.     Consolidating infiltrate in the right middle lobe with small posterior right lower lobe infiltrate.  These findings have increased from January 2025. Signed by Gerald Neely MD     XR chest 1 view     Result Date: 3/16/2025  STUDY: Chest Radiograph; 03/16/2025 9:54 AM INDICATION: Chest pain. COMPARISON: XR chest 11/30/2024, 12/06/2023. ACCESSION NUMBER(S): [id number] ORDERING CLINICIAN: KALA ACUNA  ROBIN TECHNIQUE:  Frontal chest was obtained at 09:54:00 hours. FINDINGS: CARDIOMEDIASTINAL SILHOUETTE: Cardiomediastinal silhouette is normal in size and configuration.  LUNGS: Patchy consolidation in the right base.  Underlying emphysema.  ABDOMEN: No remarkable upper abdominal findings.  BONES: No acute osseous changes.     Patchy consolidation in the right base. Correlate clinically for pneumonia. Signed by Reilly Lama MD             Results for orders placed or performed during the hospital encounter of 03/16/25 (from the past 24 hours)   MRSA Surveillance for Vancomycin De-escalation, PCR     Specimen: Anterior Nares; Swab   Result Value Ref Range     MRSA PCR Not Detected Not Detected   Legionella Antigen, Urine     Specimen: Clean Catch/Voided; Urine   Result Value Ref Range     L. pneumophila Urine Ag Negative Negative   Streptococcus pneumoniae Antigen, Urine     Specimen: Clean Catch/Voided; Urine   Result Value Ref Range     Streptococcus pneumoniae Ag, Urine Negative Negative   CBC   Result Value Ref Range     WBC 12.0 (H) 4.4 - 11.3 x10*3/uL     nRBC 0.0 0.0 - 0.0 /100 WBCs     RBC 4.32 4.00 - 5.20 x10*6/uL     Hemoglobin 11.5 (L) 12.0 - 16.0 g/dL     Hematocrit 35.5 (L) 36.0 - 46.0 %     MCV 82 80 - 100 fL     MCH 26.6 26.0 - 34.0 pg     MCHC 32.4 32.0 - 36.0 g/dL     RDW 14.0 11.5 - 14.5 %     Platelets 443 150 - 450 x10*3/uL   Basic Metabolic Panel   Result Value Ref Range     Glucose 135 (H) 74 - 99 mg/dL     Sodium 135 (L) 136 - 145 mmol/L     Potassium 3.8 3.5 - 5.3 mmol/L     Chloride 104 98 - 107 mmol/L     Bicarbonate 22 21 - 32 mmol/L     Anion Gap 13 10 - 20 mmol/L     Urea Nitrogen 19 6 - 23 mg/dL     Creatinine 0.85 0.50 - 1.05 mg/dL     eGFR 74 >60 mL/min/1.73m*2     Calcium 9.2 8.6 - 10.3 mg/dL                           Assessment/Plan     Assessment & Plan  Dyspnea     Pneumonia     Pneumonia due to infectious organism, unspecified laterality, unspecified part of lung         Nicolle Campbell is a 69 y.o. female  With a past medical history of asthma, pneumonia, right lung collapse, seasonal allergies who presents to [facility] [place] for [place] of shortness of breath.  Patient reports she went to see her pulmonologist and was given doxycycline for pneumonia which she completed.  She states she started feeling worse on Friday complaining of fatigue, decreased appetite, and shortness of breath which began around 4 AM this morning.  She denies any aggravating or alleviating factors.  She reports that she feels shortness of breath at rest and with exertion.  She is reporting a productive cough with yellow sputum.  She denies any fevers or chills.  She is reporting chest pain which worsens with cough and palpation.  No reports of nausea vomiting.     #Pneumonia  #Shortness of breath  #History asthma  Admit under the care of Dr. Teixeira  Consult pulmonology, appreciate input  cefepime, azithromycin, vancomycin in ER-continue azithromycin/rocephin-defer vanco to attending/pulmonology   Continue Solu-Medrol  Continue home inhalers  Urine strep/Legionella  Sputum culture  Supplemental O2 as needed  mucinex     #DVT ppx  Lovenox  SCD          Chief Complaint   Patient presents with    Chest Pain    Shortness of Breath       Patient arrives from home with complaints of chest pressure and tightness that has been ongoing for the past 2 days.  Patient also notes SOB and has a history of asthma.  Patient states she has been coughing constantly for the past 3 days.  Patient also states she was diagnosed with a collapsed lung back in November.          76-year-old female presenting with chest pain which is right-sided.  Significant past medical history for asthma, lung collapse right middle lobe.  Patient states that she was diagnosed with right middle lobe collapse in November.  She is been following with the pulmonologist at [facility].  Patient was on 2 weeks of Levaquin and then 10 days of  doxycycline.  She has been off of these antibiotics for couple weeks now.  Patient states that she is getting worsening pain in the right side of her chest and she feels like she cannot catch her breath.  She has been using her breathing treatments without success.  She denies fevers or chills but she does feel like she is weak.  She states he is coughing a lot more.                          had concerns including Chest Pain and Shortness of Breath (Patient arrives from home with complaints of chest pressure and tightness that has been ongoing for the past 2 days.  Patient also notes SOB and has a history of asthma.  Patient states she has been coughing constantly for the past 3 days.  Patient also states she was diagnosed with a collapsed lung back in November. ).        Problem List Items Addressed This Visit         Dyspnea     * (Principal) Pneumonia due to infectious organism, unspecified laterality, unspecified part of lung - Primary      Other Visit Diagnoses         Failure of outpatient treatment         Chest pain, unspecified type                    HPI         Shortness of Breath     Additional comments: Patient arrives from home with complaints of chest pressure and tightness that has been ongoing for the past 2 days.  Patient also notes SOB and has a history of asthma.  Patient states she has been coughing constantly for the past 3 days.  Patient also states she was diagnosed with a collapsed lung back in November.             Last edited by Сергей Spivey RN on 3/16/2025  9:08 AM.             Problem List as of 3/17/2025 Reviewed: 1/28/2025  2:23 PM by Jacoby Conklin DO             AB (asthmatic bronchitis) ([facility])      Acute URI      Asthma, moderate persistent ([facility])      Last Assessment & Plan 1/7/2025 Office Visit Written 1/7/2025  3:59 PM by Jacoby Conklin DO           Orders:    Urinalysis with Reflex Microscopic; Future    CBC and Auto Differential; Future              Astigmatism,  bilateral      Bilateral presbyopia      Cephalgia      Concussion w/o coma      Cortical age-related cataract of both eyes      Epistaxis      Fall      Genitourinary syndrome of menopause      Hearing impairment      Hearing loss, bilateral      Hypermetropia of both eyes      Laceration of right lower extremity      Left otitis externa      Malaise and fatigue      Mixed incontinence urge and stress      Nuclear sclerosis of both eyes      Pelvic pain in female      PVD (posterior vitreous detachment), both eyes      Tooth caries      Urinary frequency      UTI (urinary tract infection)      UTI symptoms      Vaginal prolapse      Vertigo      Pneumonia of right middle lobe due to infectious organism      Collapse of right lung      Last Assessment & Plan 1/7/2025 Office Visit Written 1/7/2025  3:59 PM by Jacoby Conklin DO           Orders:    Urinalysis with Reflex Microscopic; Future    Comprehensive Metabolic Panel; Future    CBC and Auto Differential; Future              Dyspnea      Pneumonia      * (Principal) Pneumonia due to infectious organism, unspecified laterality, unspecified part of lung                  Active Ambulatory Problems     Diagnosis Date Noted    AB (asthmatic bronchitis) ([facility]) 07/13/2023    Acute URI 07/13/2023    Asthma, moderate persistent ([facility]) 07/13/2023    Astigmatism, bilateral 07/13/2023    Bilateral presbyopia 07/13/2023    Cephalgia 07/13/2023    Concussion w/o coma 07/13/2023    Cortical age-related cataract of both eyes 07/13/2023    Epistaxis 07/13/2023    Fall 07/13/2023    Genitourinary syndrome of menopause 07/13/2023    Hearing impairment 07/13/2023    Hearing loss, bilateral 07/13/2023    Hypermetropia of both eyes 07/13/2023    Laceration of right lower extremity 07/13/2023    Left otitis externa 07/13/2023    Malaise and fatigue 07/13/2023    Mixed incontinence urge and stress 07/13/2023    Nuclear sclerosis of both eyes 07/13/2023    Pelvic pain in  female 07/13/2023    PVD (posterior vitreous detachment), both eyes 07/13/2023    Tooth caries 07/13/2023    Urinary frequency 07/13/2023    UTI (urinary tract infection) 07/13/2023    UTI symptoms 07/13/2023    Vaginal prolapse 07/13/2023    Vertigo 07/13/2023    Pneumonia of right middle lobe due to infectious organism 12/05/2024    Collapse of right lung 12/05/2024              Resolved Ambulatory Problems     Diagnosis Date Noted    No Resolved Ambulatory Problems              Past Medical History:   Diagnosis Date    Acute sinusitis, unspecified 07/19/2018    Acute upper respiratory infection, unspecified 07/19/2018    Concussion without loss of consciousness, initial encounter 07/19/2018    Dizziness and giddiness 07/19/2018    Encounter for immunization 12/20/2022    Frequency of micturition 07/19/2018    Headache 07/19/2018    Laceration without foreign body, right lower leg, initial encounter 07/19/2018    Moderate persistent asthma, uncomplicated ([facility]) 07/19/2018    Other allergy status, other than to drugs and biological substances      Other malaise 07/19/2018    Personal history of other diseases of the respiratory system 02/01/2018    Personal history of other diseases of the respiratory system 08/16/2021    Personal history of other diseases of the respiratory system 07/09/2015    Personal history of other specified conditions 07/09/2015    Unspecified fall, initial encounter 06/07/2016    Unspecified hearing loss, bilateral 07/19/2018    Unspecified otitis externa, left ear 03/19/2021    Urinary tract infection, site not specified 07/19/2018         Thorough record review performed of previous labs and notes from prior encounters.      Patient fully evaluated  03/17                   Active Orders   Microbiology     Respiratory Culture/Smear       Frequency: Once       Number of Occurrences: 1 Occurrences       Order Comments: Sterile Container-Ambient TemperatureSpecial Instructions: Bacterial  culture with Gram stain smear. Preferably, collect specimen before antibiotics are administered.         Nursing     Apply sequential compression device       Frequency: Until discontinued       Number of Occurrences: Until Specified     Height on admission       Frequency: Once       Number of Occurrences: 1 Occurrences     Notify provider       Frequency: Until discontinued       Number of Occurrences: Until Specified     Obtain cultures before antibiotics are started. Administer antimicrobial therapy within 4 hours of presentation.       Frequency: Until discontinued       Number of Occurrences: Until Specified     Vital Signs       Frequency: q4h       Number of Occurrences: Until Specified     Weight on admission       Frequency: Once       Number of Occurrences: 1 Occurrences   Consult     Inpatient consult to Dietitian       Frequency: Once       Number of Occurrences: 1 Occurrences       Order Comments: Nursing Admission Screening        Inpatient consult to Pulmonology       Frequency: Once       Number of Occurrences: 1 Occurrences   Nourishments     May Participate in Room Service       Frequency: Once       Number of Occurrences: 1 Occurrences   Respiratory Care     Respiratory care eval and treat       Frequency: Once       Number of Occurrences: 1 Occurrences   Admission     Admit to inpatient       Frequency: Once       Number of Occurrences: 1 Occurrences   Telemetry     Telemetry monitoring       Frequency: Until discontinued       Number of Occurrences: 3 Days   Medications     acetaminophen (Tylenol) tablet 650 mg       Frequency: q4h PRN       Dose: 650 mg       Route: oral     albuterol 2.5 mg /3 mL (0.083 %) nebulizer solution 3 mL       Frequency: q2h PRN       Dose: 3 mL       Route: nebulization     azithromycin (Zithromax) 500 mg in dextrose 5%  mL       Frequency: q24h       Dose: 500 mg       Route: intravenous     benzonatate (Tessalon) capsule 100 mg       Frequency: TID PRN        "Dose: 100 mg       Route: oral     cefTRIAXone (Rocephin) 2 g in dextrose (iso) IV 50 mL       Frequency: q24h       Dose: 2 g       Route: intravenous     enoxaparin (Lovenox) syringe 40 mg       Frequency: Daily       Dose: 40 mg       Route: subcutaneous     fluticasone furoate-vilanteroL (Breo Ellipta) 200-25 mcg/dose inhaler 1 puff       Frequency: Daily       Dose: 1 puff       Route: inhalation     guaiFENesin (Mucinex) 12 hr tablet 600 mg       Frequency: BID PRN       Dose: 600 mg       Route: oral     ipratropium-albuteroL (Duo-Neb) 0.5-2.5 mg/3 mL nebulizer solution 3 mL       Frequency: TID       Dose: 3 mL       Route: nebulization     methylPREDNISolone sod succinate (SOLU-Medrol) 40 mg/mL injection 40 mg       Frequency: q24h       Dose: 40 mg       Route: intravenous     montelukast (Singulair) tablet 10 mg       Frequency: Nightly       Dose: 10 mg       Route: oral     oxygen (O2) therapy       Frequency: Continuous PRN - O2/gases       Route: inhalation      Dietary Orders (From admission, onward)                  Start     Ordered     03/17/25 1329   May Participate in Room Service  ( ROOM SERVICE MAY PARTICIPATE)  Once        Question:  .  Answer:  Yes    03/17/25 1328                       69 yrs@  ADMITDTTM@  Pneumonia due to infectious organism, unspecified laterality, unspecified part of lung [J18.9]  [unfilled]  Weight: 49.9 kg (110 lb)     Vitals               Vitals:     03/16/25 0908 03/16/25 1000 03/16/25 1100 03/16/25 1130   BP: 126/61 133/67 135/70 133/65   Pulse: 84 78 75 78   Resp: (!) 24 (!) 36 (!) 21 (!) 22   Temp: 36.8 °C (98.2 °F)         TempSrc: Temporal         SpO2: 95% 97% 97% 96%   Weight: 49.9 kg (110 lb)         Height: 1.676 m (5' 6\")           03/16/25 1200 03/16/25 1300 03/16/25 1400 03/16/25 1500   BP: 130/64 134/70 144/65 142/68   Pulse: 76 92 94 97   Resp: (!) 23 (!) 22 (!) 26 (!) 21   Temp:           TempSrc:           SpO2: 100% 94% 94% 94%   Weight:         "   Height:             03/16/25 2000 03/16/25 2100 03/16/25 2125 03/16/25 2200   BP:   154/62 117/60 114/61   Pulse: 98 93 100 99   Resp: (!) 26 (!) 22 20 (!) 26   Temp:           TempSrc:           SpO2: 95% 94% 95% (!) 93%   Weight:           Height:             03/16/25 2259 03/16/25 2300 03/17/25 0000 03/17/25 0015   BP: 118/58 118/58 116/55     Pulse: 93 91 (!) 103 (!) 109   Resp: (!) 21 (!) 21 (!) 23 (!) 31   Temp:           TempSrc:           SpO2: 94% 94% (!) 93% 96%   Weight:           Height:             03/17/25 0030 03/17/25 0045 03/17/25 0100 03/17/25 0115   BP:     118/55     Pulse: (!) 102 (!) 101 98 96   Resp: (!) 27 (!) 21 (!) 21 (!) 22   Temp:           TempSrc:           SpO2: 94% 94% (!) 93% 94%   Weight:           Height:             03/17/25 0130 03/17/25 0145 03/17/25 0200 03/17/25 0215   BP:     110/57     Pulse: 96 90 90 (!) 102   Resp: (!) 21 20 (!) 21 (!) 23   Temp:           TempSrc:           SpO2: 95% 94% 94% 96%   Weight:           Height:             03/17/25 0230 03/17/25 0245 03/17/25 0300 03/17/25 0315   BP:     128/69     Pulse: 90 87 91 88   Resp: 19 20 17 18   Temp:           TempSrc:           SpO2: (!) 93% 94% (!) 93% 95%   Weight:           Height:             03/17/25 0330 03/17/25 0345 03/17/25 0400 03/17/25 0415   BP:     126/78     Pulse: 88 87 87 88   Resp: 19 18 19 19   Temp:           TempSrc:           SpO2: 94% 95% 94% 95%   Weight:           Height:             03/17/25 0430 03/17/25 0445 03/17/25 0500 03/17/25 0558   BP:     105/51 110/61   Pulse: 85 91 88 85   Resp: 19 (!) 22 (!) 22 (!) 22   Temp:           TempSrc:           SpO2: 96% 95% 95% 97%   Weight:           Height:             03/17/25 0600 03/17/25 0700 03/17/25 0734 03/17/25 0800   BP: 106/55 109/63   106/56   Pulse: 83 81   93   Resp: (!) 21 (!) 21   (!) 24   Temp:           TempSrc:           SpO2: 97% 97% 98% 97%   Weight:           Height:             03/17/25 0858 03/17/25 1000 03/17/25 1100  03/17/25 1200   BP:   115/69 95/52 99/57   Pulse:   (!) 113 85 84   Resp:   (!) 39   (!) 29   Temp:           TempSrc:           SpO2: 98% 94% 99% 98%   Weight:           Height:             03/17/25 1400   BP: 109/58   Pulse: 91   Resp: (!) 30   Temp:     TempSrc:     SpO2: 98%   Weight:     Height:                    Chief Complaint    Chest Pain; Shortness of Breath            Patient seen resting in bed with head of bed elevated, no s/s or c/o acute difficulties at this time.  Vital signs for last 24 hours Heart Rate:  [] 91  Respirations:  [17-39] 30  BP: ()/(51-78) 109/58    No intake/output data recorded.  Patient still requiring frequent cardiac and SPO2 monitoring. Continue aggressive pulmonary hygiene and oral hygiene. Off loading as tolerated for skin integrity. Medications and labs reviewed-    Patient recently received an antibiotic (last 12 hours)         None                        Results for orders placed or performed during the hospital encounter of 03/16/25 (from the past 96 hours)   CBC and Auto Differential   Result Value Ref Range     WBC 8.6 4.4 - 11.3 x10*3/uL     nRBC 0.0 0.0 - 0.0 /100 WBCs     RBC 4.55 4.00 - 5.20 x10*6/uL     Hemoglobin 12.3 12.0 - 16.0 g/dL     Hematocrit 38.2 36.0 - 46.0 %     MCV 84 80 - 100 fL     MCH 27.0 26.0 - 34.0 pg     MCHC 32.2 32.0 - 36.0 g/dL     RDW 13.8 11.5 - 14.5 %     Platelets 452 (H) 150 - 450 x10*3/uL     Neutrophils % 60.5 40.0 - 80.0 %     Immature Granulocytes %, Automated 0.1 0.0 - 0.9 %     Lymphocytes % 18.1 13.0 - 44.0 %     Monocytes % 6.5 2.0 - 10.0 %     Eosinophils % 14.3 0.0 - 6.0 %     Basophils % 0.5 0.0 - 2.0 %     Neutrophils Absolute 5.20 1.20 - 7.70 x10*3/uL     Immature Granulocytes Absolute, Automated 0.01 0.00 - 0.70 x10*3/uL     Lymphocytes Absolute 1.56 1.20 - 4.80 x10*3/uL     Monocytes Absolute 0.56 0.10 - 1.00 x10*3/uL     Eosinophils Absolute 1.23 (H) 0.00 - 0.70 x10*3/uL     Basophils Absolute 0.04 0.00 - 0.10  x10*3/uL   Comprehensive metabolic panel   Result Value Ref Range     Glucose 110 (H) 74 - 99 mg/dL     Sodium 135 (L) 136 - 145 mmol/L     Potassium 4.9 3.5 - 5.3 mmol/L     Chloride 102 98 - 107 mmol/L     Bicarbonate 27 21 - 32 mmol/L     Anion Gap 11 10 - 20 mmol/L     Urea Nitrogen 13 6 - 23 mg/dL     Creatinine 0.82 0.50 - 1.05 mg/dL     eGFR 77 >60 mL/min/1.73m*2     Calcium 9.2 8.6 - 10.3 mg/dL     Albumin 4.1 3.4 - 5.0 g/dL     Alkaline Phosphatase 104 33 - 136 U/L     Total Protein 7.5 6.4 - 8.2 g/dL     AST 24 9 - 39 U/L     Bilirubin, Total 0.4 0.0 - 1.2 mg/dL     ALT 20 7 - 45 U/L   B-Type Natriuretic Peptide   Result Value Ref Range     BNP 36 0 - 99 pg/mL   D-Dimer, VTE Exclusion   Result Value Ref Range     D-Dimer, Quantitative VTE Exclusion 720 (H) <=500 ng/mL FEU   Sars-CoV-2 and Influenza A/B PCR   Result Value Ref Range     Flu A Result Not Detected Not Detected     Flu B Result Not Detected Not Detected     Coronavirus 2019, PCR Not Detected Not Detected   RSV PCR   Result Value Ref Range     RSV PCR Not Detected Not Detected   Troponin I, High Sensitivity, Initial   Result Value Ref Range     Troponin I, High Sensitivity 7 0 - 13 ng/L   ECG 12 lead   Result Value Ref Range     Ventricular Rate 95 BPM     Atrial Rate 95 BPM     FL Interval 137 ms     QRS Duration 98 ms     QT Interval 362 ms     QTC Calculation(Bazett) 455 ms     P Axis 69 degrees     R Axis 53 degrees     T Axis 53 degrees     QRS Count 16 beats     Q Onset 254 ms     T Offset 435 ms     QTC Fredericia 421 ms   Troponin, High Sensitivity, 1 Hour   Result Value Ref Range     Troponin I, High Sensitivity 7 0 - 13 ng/L   MRSA Surveillance for Vancomycin De-escalation, PCR     Specimen: Anterior Nares; Swab   Result Value Ref Range     MRSA PCR Not Detected Not Detected   Legionella Antigen, Urine     Specimen: Clean Catch/Voided; Urine   Result Value Ref Range     L. pneumophila Urine Ag Negative Negative   Streptococcus  pneumoniae Antigen, Urine     Specimen: Clean Catch/Voided; Urine   Result Value Ref Range     Streptococcus pneumoniae Ag, Urine Negative Negative   CBC   Result Value Ref Range     WBC 12.0 (H) 4.4 - 11.3 x10*3/uL     nRBC 0.0 0.0 - 0.0 /100 WBCs     RBC 4.32 4.00 - 5.20 x10*6/uL     Hemoglobin 11.5 (L) 12.0 - 16.0 g/dL     Hematocrit 35.5 (L) 36.0 - 46.0 %     MCV 82 80 - 100 fL     MCH 26.6 26.0 - 34.0 pg     MCHC 32.4 32.0 - 36.0 g/dL     RDW 14.0 11.5 - 14.5 %     Platelets 443 150 - 450 x10*3/uL   Basic Metabolic Panel   Result Value Ref Range     Glucose 135 (H) 74 - 99 mg/dL     Sodium 135 (L) 136 - 145 mmol/L     Potassium 3.8 3.5 - 5.3 mmol/L     Chloride 104 98 - 107 mmol/L     Bicarbonate 22 21 - 32 mmol/L     Anion Gap 13 10 - 20 mmol/L     Urea Nitrogen 19 6 - 23 mg/dL     Creatinine 0.85 0.50 - 1.05 mg/dL     eGFR 74 >60 mL/min/1.73m*2     Calcium 9.2 8.6 - 10.3 mg/dL      Plan discussed with interdisciplinary team, consults placed, appreciate input. Will continue current and repeat labs in the AM.       Discharge planning discussed with patient and care team. Therapy evaluations ordered. Patient aware and agreeable to current plan, continue plan as above.      I spent a total of 75 minutes on the date of the service which included preparing to see the patient, face-to-face patient care, completing clinical documentation, obtaining and/or reviewing separately obtained history, performing a medically appropriate examination, counseling and educating the patient/family/caregiver, ordering medications, tests, or procedures, communicating with other HCPs (not separately reported), independently interpreting results (not separately reported), communicating results to the patient/family/caregiver, and care coordination (not separately reported).         Tahira Bacon                  Revision History                   Objective  Last Recorded Vitals  /63   Pulse 83   Temp 36.3 °C (97.3 °F)   Resp  19   Wt 49.9 kg (110 lb)   SpO2 95%   Intake/Output last 3 Shifts:     Intake/Output Summary (Last 24 hours) at 3/18/2025 1615  Last data filed at 3/18/2025 0500      Gross per 24 hour   Intake 350 ml   Output --   Net 350 ml         Admission Weight  Weight: 49.9 kg (110 lb) (03/16/25 0908)     Daily Weight  03/16/25 : 49.9 kg (110 lb)     Image Results  ECG 12 lead  Sinus rhythm  Atrial premature complex        Physical Exam     Relevant Results                       Assessment/Plan        Assessment & Plan  Dyspnea     Pneumonia     Pneumonia due to infectious organism, unspecified laterality, unspecified part of lung     Patient fully evaluated 3/18   for    Problem List Items Addressed This Visit                  Pulmonary and Pneumonias     Dyspnea     * (Principal) Pneumonia due to infectious organism, unspecified laterality, unspecified part of lung - Primary      Other Visit Diagnoses         Failure of outpatient treatment         Chest pain, unspecified type                 Patient seen resting in bed with head of bed elevated, no s/s or c/o acute difficulties at this time.  Vital signs for last 24 hours Temp:  [35.9 °C (96.6 °F)-36.3 °C (97.3 °F)] 36.3 °C (97.3 °F)  Heart Rate:  [76-93] 83  Resp:  [17-26] 19  BP: (100-128)/(51-70) 114/63    No intake/output data recorded.  Patient still requiring frequent cardiac and SPO2 monitoring. Continue aggressive pulmonary hygiene and oral hygiene. Off loading as tolerated for skin integrity. Medications and labs reviewed-         Results for orders placed or performed during the hospital encounter of 03/16/25 (from the past 24 hours)   CBC and Auto Differential   Result Value Ref Range     WBC 11.9 (H) 4.4 - 11.3 x10*3/uL     nRBC 0.0 0.0 - 0.0 /100 WBCs     RBC 4.56 4.00 - 5.20 x10*6/uL     Hemoglobin 12.1 12.0 - 16.0 g/dL     Hematocrit 38.7 36.0 - 46.0 %     MCV 85 80 - 100 fL     MCH 26.5 26.0 - 34.0 pg     MCHC 31.3 (L) 32.0 - 36.0 g/dL     RDW 14.3 11.5 -  14.5 %     Platelets 462 (H) 150 - 450 x10*3/uL     Neutrophils % 92.6 40.0 - 80.0 %     Immature Granulocytes %, Automated 0.5 0.0 - 0.9 %     Lymphocytes % 5.7 13.0 - 44.0 %     Monocytes % 0.7 2.0 - 10.0 %     Eosinophils % 0.3 0.0 - 6.0 %     Basophils % 0.2 0.0 - 2.0 %     Neutrophils Absolute 10.97 (H) 1.20 - 7.70 x10*3/uL     Immature Granulocytes Absolute, Automated 0.06 0.00 - 0.70 x10*3/uL     Lymphocytes Absolute 0.68 (L) 1.20 - 4.80 x10*3/uL     Monocytes Absolute 0.08 (L) 0.10 - 1.00 x10*3/uL     Eosinophils Absolute 0.04 0.00 - 0.70 x10*3/uL     Basophils Absolute 0.02 0.00 - 0.10 x10*3/uL   Comprehensive Metabolic Panel   Result Value Ref Range     Glucose 157 (H) 74 - 99 mg/dL     Sodium 135 (L) 136 - 145 mmol/L     Potassium 4.6 3.5 - 5.3 mmol/L     Chloride 104 98 - 107 mmol/L     Bicarbonate 23 21 - 32 mmol/L     Anion Gap 13 10 - 20 mmol/L     Urea Nitrogen 22 6 - 23 mg/dL     Creatinine 0.87 0.50 - 1.05 mg/dL     eGFR 72 >60 mL/min/1.73m*2     Calcium 9.2 8.6 - 10.3 mg/dL     Albumin 3.9 3.4 - 5.0 g/dL     Alkaline Phosphatase 87 33 - 136 U/L     Total Protein 7.1 6.4 - 8.2 g/dL     AST 21 9 - 39 U/L     Bilirubin, Total 0.3 0.0 - 1.2 mg/dL     ALT 18 7 - 45 U/L      Patient recently received an antibiotic (last 12 hours)         Date/Time Action Medication Dose Rate     03/18/25 4360 New Bag    azithromycin (Zithromax) 500 mg in dextrose 5%  mL 500 mg 250 mL/hr             Patient was fully evaluated today 3/18. Vitals today include  Temp:  [35.9 °C (96.6 °F)-36.3 °C (97.3 °F)] 36.3 °C (97.3 °F), Heart Rate:  [76-93] 83, Resp:  [17-26] 19, BP: (100-128)/(51-70) 114/63. WBCs 11.9, Hbg12.1, Plts 462, Na 135, K 4.6, Cl 104, HCO3- 23, BUN 22, Cr 0.87, glucose 157. Appreciate pulm input concerning bronchoscopy. Continue to monitor.      Plan discussed with interdisciplinary team, continue current and repeat labs in the AM.      Discharge planning discussed with patient and care team. Patient  aware and agreeable to current plan, continue plan as above.      I spent a total of 50 minutes on the date of the service which included preparing to see the patient, face-to-face patient care, completing clinical documentation, obtaining and/or reviewing separately obtained history, performing a medically appropriate examination, counseling and educating the patient/family/caregiver, ordering medications, tests, or procedures, communicating with other HCPs (not separately reported), independently interpreting results (not separately reported), communicating results to the patient/family/caregiver, and care coordination (not separately reported).         MINH BLOCK                      Revision History      Pertinent Physical Exam At Time of Discharge  Physical Exam    Outpatient Follow-Up  Future Appointments   Date Time Provider Department Center   4/21/2025  1:40 PM Aaron Farias MD [id number] [place]         Tyree Teixeira MD, PA-S

## 2025-03-20 NOTE — CONSULTS
Pulmonary Critical Care note    Patient Name :Nicolle Campbell  Date of service : 03/20/25  MRN: [id number]  YOB: 1955      Interval History:  March 20, 2025  S/p bronchoscopy during which significant amount of mucous plugs were removed, brushing BAL and biopsies were done   History of Present Illness:      69 y.o. female  on day  4 of admission presenting with Pneumonia due to infectious organism, unspecified laterality, unspecified part of lung.  Patient is 69 years old  female who receives her care typically has seen general show presented with increased shortness of breath, she was seen recently by her pulmonologist send she was identified to have right middle lobe infiltrate which has been waxing and waning, presented with increased shortness of breath, coughing, progressive weight loss, and the need for oxygen, her chest CT showed reemergence of right middle lobe atelectasis  Past Medical/Surgical History:    has a past medical history of Acute sinusitis, unspecified (07/19/2018), Acute upper respiratory infection, unspecified (07/19/2018), Concussion without loss of consciousness, initial encounter (07/19/2018), Dizziness and giddiness (07/19/2018), Encounter for immunization (12/20/2022), Epistaxis (07/19/2018), Frequency of micturition (07/19/2018), Headache (07/19/2018), Laceration without foreign body, right lower leg, initial encounter (07/19/2018), Moderate persistent asthma, uncomplicated ([facility]) (07/19/2018), Other allergy status, other than to drugs and biological substances, Other malaise (07/19/2018), Personal history of other diseases of the respiratory system (02/01/2018), Personal history of other diseases of the respiratory system (08/16/2021), Personal history of other diseases of the respiratory system (07/09/2015), Personal history of other specified conditions (07/09/2015), Unspecified fall, initial encounter (06/07/2016), Unspecified hearing loss, bilateral  (07/19/2018), Unspecified otitis externa, left ear (03/19/2021), and Urinary tract infection, site not specified (07/19/2018).   has a past surgical history that includes Other surgical history (06/29/2021).   reports that she has never smoked. She has never used smokeless tobacco. She reports that she does not drink alcohol and does not use drugs.  Family History:   No relevant family history has been documented for this patient.    Allergies:     Bean, Bee pollen, Corn, Penicillin, Prednisone, and Wheat      Social history:   reports that she has never smoked. She has never used smokeless tobacco. She reports that she does not drink alcohol and does not use drugs.      Review of Systems:   General: denies weight gain, denies loss of appetite, fever, chills, night sweats.  HEENT: denies headaches, dizziness, head trauma, visual changes, eye pain, tinnitus, nosebleeds, hoarseness or throat pain    Respiratory: denies chest pain, dyspnea, cough and hemoptysis  Cardiovascular: denies orthopnea, paroxysmal nocturnal dyspnea, leg swelling, and previous heart attack.    Gastrointestinal: denies pain, nausea vomiting, diarrhea, constipation, melena or bleeding.  Genitourinary: denies hematuria, frequency, urgency or dysuria  Neurology: denies syncope, seizures, paralysis, paraesthesia   Endocrine: denies polyuria, polydipsia, skin or hair changes, and heat or cold intolerance  Musculoskeletal: denies joint pain, swelling, arthritis or myalgia  Hematologic: denies bleeding, adenopathy and easy bruising  Skin: denies rashes and skin discoloration  Psychiatry: denies depression    Physical Exam:   Vital Signs:   Vitals:    03/20/25 1144   BP:    Pulse:    Resp:    Temp:    SpO2: 96%     Vitals:    03/19/25 1213   Weight: 49.9 kg (110 lb)         Input/Output:    Intake/Output Summary (Last 24 hours) at 3/20/2025 1509  Last data filed at 3/19/2025 1908  Gross per 24 hour   Intake 300 ml   Output --   Net 300 ml       Oxygen  requirements:    Ventilator Information:     Oxygen Therapy/Pulse Ox  Medical Gas Therapy: None (Room air)  SpO2: 96 %  Patient Activity During SpO2 Measurement: At rest  Temp: 36.5 °C (97.7 °F)        General appearance: Not in pain or distress, in no respiratory distress    HEENT: Atraumatic/normocephalic, EOMI, VITA, pharynx clear, moist mucosa, redness of the uvula appreciated,   Neck: Supple, no jugular venous distension, lymphadenopathy, thyromegaly or carotid bruits  Chest: Bilateral rhonchi  Cardiovascular: Normal S1, S2, regular rate and rhythm, no murmur, rub or gallop  Abdomen: Normal sounds present, soft, lax with no tenderness, no hepatosplenomegaly, and no masses  Extremities: No edema. Pulses are equally present.   Skin: intact, no rashes   Neurologic: Alert and oriented x 3, No focal deficit         Current Medications:   Scheduled medications  azithromycin, 500 mg, intravenous, q24h  cefTRIAXone, 2 g, intravenous, q24h  enoxaparin, 40 mg, subcutaneous, Daily  fluticasone furoate-vilanteroL, 1 puff, inhalation, Daily  ipratropium-albuteroL, 3 mL, nebulization, TID  methylPREDNISolone sodium succinate (PF), 40 mg, intravenous, q24h  montelukast, 10 mg, oral, Nightly  oxygen, , inhalation, Continuous - Inhalation      Continuous medications     PRN medications  PRN medications: acetaminophen, albuterol, benzonatate, guaiFENesin, HYDROmorphone, HYDROmorphone, meperidine, ondansetron, oxygen, promethazine      Investigations:  Labs, radiological imaging and cardiac work up were personally reviewed    Results from last 7 days   Lab Units 03/20/25  0628 03/19/25  0702 03/18/25  0631   SODIUM mmol/L 135* 136 135*   POTASSIUM mmol/L 4.1 4.4 4.6   CHLORIDE mmol/L 103 104 104   CO2 mmol/L 24 23 23   BUN mg/dL 22 23 22   CREATININE mg/dL 0.70 0.67 0.87   GLUCOSE mg/dL 166* 168* 157*   CALCIUM mg/dL 9.1 9.3 9.2     Results from last 7 days   Lab Units 03/20/25  0628   WBC AUTO x10*3/uL 12.4*   HEMOGLOBIN g/dL  11.2*   HEMATOCRIT % 36.4   PLATELETS AUTO x10*3/uL 494*         ECG 12 lead    Result Date: 3/17/2025  Sinus rhythm Atrial premature complex    CT angio chest for pulmonary embolism    Result Date: 3/16/2025  STUDY: CT Angiogram of the Chest; 3/16/2025 3:41 PM INDICATION: Chest pain. COMPARISON: CT chest 3/16/2025. ACCESSION NUMBER(S): [id number] ORDERING CLINICIAN: KALA KELLY TECHNIQUE:  CTA of the chest was performed with intravenous contrast. Images are reviewed and processed at a workstation according to the CT angiogram protocol with 3-D and/or MIP post processing imaging generated.  Omnipaque 350 75 mL was administered intravenously. Automated mA/kV exposure control was utilized and patient examination was performed in strict accordance with principles of ALARA. FINDINGS: No central filling defect is seen within the main, lobar or proximal segmental pulmonary arteries to suggest an acute pulmonary embolism.. No thoracic aortic aneurysm, dissection or periaortic inflammation is seen.. Heart remains normal in size without pericardial or significant pleural effusion..  Multiple prominent mediastinal and right hilar lymph nodes are seen, possibly reactive however remain nonspecific and amenable to follow-up. There is no pleural effusion, pleural thickening, or pneumothorax. The trachea and right and left mainstem bronchi are patent. There is endobronchial hypodensity with occlusion of the right middle lobe bronchus and occlusion of the posterior segmental branch of the right lower lobe bronchus, series 506 image 154 and 176.. Again seen is consolidative airspace opacity in the right middle lobe with air bronchograms, not significantly changed in the interval since the recent comparison examination. Atelectasis and small amount of superposed infiltrate also seen at the right lung base, stable from the comparison examination. Stable biapical fibrosis is again seen.. A 6 mm enhancing focus is seen in the  right posterior hepatic segment, series 501 image 284, possibly a flash filling hemangioma however remains nonspecific on a single phase CT. There are no acute fractures.  No suspicious bony lesions.    1. No central pulmonary embolism, aortic aneurysm or dissection. 2. Endobronchial hypodensity with occlusion of the right middle lobe bronchus and occlusion of the posterior segmental branch of the right lower lobe bronchus. Again seen is consolidative airspace opacity in the right middle lobe with air bronchograms, not significantly changed in the interval since the recent comparison examination. Follow-up can document resolution to exclude any underlying pathology. 3. Prominent mediastinal and right hilar lymph nodes, possibly reactive however remain nonspecific and amenable to follow-up. Signed by Foster Watson MD    CT chest wo IV contrast    Result Date: 3/16/2025  STUDY: CT Chest without IV Contrast; 03/16/2025, 2:31 PM INDICATION: Cough. COMPARISON: CT chest 01/22/2025,11/30/2024. ACCESSION NUMBER(S): [id number] ORDERING CLINICIAN: KALA KELLY TECHNIQUE:  CT of the chest was performed without contrast. Automated mA/kV exposure control was utilized and patient examination was performed in strict accordance with principles of ALARA. FINDINGS: MEDIASTINUM: The heart is normal in size without pericardial effusion.  Coronary artery calcifications are identified.  LUNGS/PLEURA: There is no pleural effusion, pleural thickening, or pneumothorax. The airways are patent. There is a dense consolidating infiltrate in the right middle lobe. There is a small infiltrate posteriorly in the right lower lobe. LYMPH NODES: Thoracic lymph nodes are not enlarged. UPPER ABDOMEN: Upper abdomen demonstrates no acute pathology. BONES: There are no acute fractures.  No suspicious bony lesions.    Consolidating infiltrate in the right middle lobe with small posterior right lower lobe infiltrate.  These findings have increased  from January 2025. Signed by Gerald Neely MD    XR chest 1 view    Result Date: 3/16/2025  STUDY: Chest Radiograph; 03/16/2025 9:54 AM INDICATION: Chest pain. COMPARISON: XR chest 11/30/2024, 12/06/2023. ACCESSION NUMBER(S): [id number] ORDERING CLINICIAN: KALA KELLY TECHNIQUE:  Frontal chest was obtained at 09:54:00 hours. FINDINGS: CARDIOMEDIASTINAL SILHOUETTE: Cardiomediastinal silhouette is normal in size and configuration.  LUNGS: Patchy consolidation in the right base.  Underlying emphysema.  ABDOMEN: No remarkable upper abdominal findings.  BONES: No acute osseous changes.    Patchy consolidation in the right base. Correlate clinically for pneumonia. Signed by Reilly Lama MD    mpression  Moderate cobblestone, erythematous mucosa in the left lung and right lung  Friable mucosa in the RML  Mucus plugs with greater than 75% obstruction in the bronchus intermedius; performed therapeutic aspiration; performed washing. No obstruction remained after intervention  Mucus plugs with complete obstruction in the right middle lobar bronchus; performed washing. No obstruction remained after intervention  Bronchoalveolar lavage was performed in the right middle lobar bronchus and the fluid appeared cloudy and purulent  Performed brushings with microbiology brush in the RML  Performed transbronchial biopsies in the right medial segment (RB5), sent sample for histology analysis with 5 mL of bleeding; applied hemostatic agent to control bleeding  Assessment  Nicolle Campbell IS 69 y.o. female  on day  4 of admission presenting with Pneumonia due to infectious organism, unspecified laterality, unspecified part of lung. Actively being treated for the  following medical Problems:    Acute hypoxic respiratory failure  Recurrent right middle lobe infiltrate  Right lower lobe atelectasis  Bronchiectasis   cachexia BMI of 17.7      Recommendation:  Please see bronchoscopy report above  Follow-up results of cytologies, BAL  and culture  I explained to the patient that the right middle lobe syndrome the causes could be infectious but occasionally it related to poor versus secretion management for which she recommended nebulizer treatment, mucolytic therapy and CoughAssist devices which patient already has at home  May discharge from pulmonary standpoint with follow-up in 3 to 4 weeks upon the conclusion of AFB cultures  Taper steroids  Antibiotics for total of 7 days  Oxygen for saturation of 89 to 94%  Incentive spirometry  Bronchodilators therapy as needed  PT/OT  DVT prophylaxis  Minimize benzodiazepine and narcotics  Encourage ambulation  Head evaluation and aspiration precautions.  Patient is on home aggressive bronchopulmonary hygiene with bronchodilator therapy, CoughAssist devices and mucolytic therapy      Waylon Roman MD  Pulmonary critical care consultant  03/20/25  3:09 PM       STAFF PHYSICIAN NOTE OF PERSONAL INVOLVEMENT IN CARE  As the attending physician, I certify that I personally reviewed the patient's history and personally examined the patient to confirm the physical findings described above, and that I reviewed the relevant imaging studies and available reports.  I also discussed the differential diagnosis and all of the proposed management plans with the patient and individuals accompanying the patient to this visit.  They had the opportunity to ask questions about the proposed management plans and to have those questions answered.

## 2025-03-20 NOTE — PROGRESS NOTES
Notified pt that the supplement (Ensure plant based protein) discussed yesterday during nutrition assessment contains keanu bean - pt declines ONS due to allergen. Feels she is getting enough food between trays from facility and food her family has brought in from home. Encouraged pt to notify nutrition services department if she has any further concerns about nutrition or issues concerning meals or supplements.

## 2025-03-21 ENCOUNTER — PATIENT OUTREACH (OUTPATIENT)
Dept: CARE COORDINATION | Facility: CLINIC | Age: 70
End: 2025-03-21
Payer: MEDICARE

## 2025-03-21 LAB
ACID FAST STN SPEC: NORMAL
BACTERIA SPEC RESP CULT: NORMAL
BACTERIA SPEC RESP CULT: NORMAL
FUNGUS SPEC CULT: NORMAL
FUNGUS SPEC FUNGUS STN: NORMAL
GRAM STN SPEC: NORMAL
MYCOBACTERIUM SPEC CULT: NORMAL

## 2025-03-21 PROCEDURE — 93005 ELECTROCARDIOGRAM TRACING: CPT

## 2025-03-21 SDOH — ECONOMIC STABILITY: GENERAL: WOULD YOU LIKE HELP WITH ANY OF THE FOLLOWING NEEDS?: I DONT NEED HELP WITH ANY OF THESE

## 2025-03-21 NOTE — PROGRESS NOTES
Outreach call to patient to support a smooth transition of care from recent admission.  Spoke with patient, reviewed discharge medications, discharge instructions, assessed social needs, and provided education on importance of follow-up appointment with provider.  Will continue to monitor through transition period.

## 2025-03-22 LAB
BACTERIA SPEC RESP CULT: ABNORMAL
BACTERIA SPEC RESP CULT: ABNORMAL
GRAM STN SPEC: ABNORMAL
GRAM STN SPEC: ABNORMAL

## 2025-03-24 LAB
ATRIAL RATE: 98 BPM
FUNGUS SPEC CULT: NORMAL
FUNGUS SPEC FUNGUS STN: NORMAL
P AXIS: 69 DEGREES
P OFFSET: 199 MS
P ONSET: 154 MS
PR INTERVAL: 136 MS
Q ONSET: 222 MS
QRS COUNT: 16 BEATS
QRS DURATION: 82 MS
QT INTERVAL: 344 MS
QTC CALCULATION(BAZETT): 439 MS
QTC FREDERICIA: 405 MS
R AXIS: 48 DEGREES
T AXIS: 50 DEGREES
T OFFSET: 394 MS
VENTRICULAR RATE: 98 BPM

## 2025-03-26 LAB
ACID FAST STN SPEC: NORMAL
MYCOBACTERIUM SPEC CULT: NORMAL

## 2025-03-28 DIAGNOSIS — J45.40 MODERATE PERSISTENT ASTHMA, UNSPECIFIED WHETHER COMPLICATED (HHS-HCC): Primary | ICD-10-CM

## 2025-03-28 LAB
BACTERIA SPEC RESP CULT: NORMAL
BACTERIA SPEC RESP CULT: NORMAL
GRAM STN SPEC: NORMAL
GRAM STN SPEC: NORMAL

## 2025-03-31 LAB
FUNGUS SPEC CULT: NORMAL
FUNGUS SPEC FUNGUS STN: NORMAL
LABORATORY COMMENT REPORT: NORMAL
PATH REPORT.FINAL DX SPEC: NORMAL
PATH REPORT.GROSS SPEC: NORMAL
PATH REPORT.RELEVANT HX SPEC: NORMAL
PATH REPORT.TOTAL CANCER: NORMAL
RESIDENT REVIEW: NORMAL

## 2025-04-01 ENCOUNTER — TELEPHONE (OUTPATIENT)
Dept: PRIMARY CARE | Facility: CLINIC | Age: 70
End: 2025-04-01
Payer: MEDICARE

## 2025-04-01 DIAGNOSIS — J45.41 MODERATE PERSISTENT ASTHMA WITH ACUTE EXACERBATION (HHS-HCC): ICD-10-CM

## 2025-04-01 DIAGNOSIS — J18.1: ICD-10-CM

## 2025-04-01 RX ORDER — MONTELUKAST SODIUM 10 MG/1
10 TABLET ORAL NIGHTLY
Qty: 30 TABLET | Refills: 1 | Status: SHIPPED | OUTPATIENT
Start: 2025-04-01 | End: 2026-04-01

## 2025-04-01 NOTE — TELEPHONE ENCOUNTER
Med Refill    Singulair -montelukast 10mg tab  1 p.o. nightly.  LR: 3/20/25    Pt was given med on discharge from hospital for pulmonary issue.  They can not find the medication anywhere.  They lost the bottle.    Pharmacy- [address] [place]  Ph- [phone]    LV: 1/28/25  NV: ?

## 2025-04-02 LAB
ACID FAST STN SPEC: NORMAL
MYCOBACTERIUM SPEC CULT: NORMAL

## 2025-04-07 LAB
FUNGUS SPEC CULT: NORMAL
FUNGUS SPEC FUNGUS STN: NORMAL

## 2025-04-09 LAB
ACID FAST STN SPEC: NORMAL
MYCOBACTERIUM SPEC CULT: NORMAL

## 2025-04-14 ENCOUNTER — APPOINTMENT (OUTPATIENT)
Dept: PRIMARY CARE | Facility: CLINIC | Age: 70
End: 2025-04-14
Payer: MEDICARE

## 2025-04-14 VITALS — TEMPERATURE: 97.7 F | SYSTOLIC BLOOD PRESSURE: 112 MMHG | DIASTOLIC BLOOD PRESSURE: 64 MMHG

## 2025-04-14 DIAGNOSIS — J47.0 BRONCHIECTASIS WITH ACUTE LOWER RESPIRATORY INFECTION: ICD-10-CM

## 2025-04-14 DIAGNOSIS — J18.9 PNEUMONIA OF RIGHT MIDDLE LOBE DUE TO INFECTIOUS ORGANISM: Primary | ICD-10-CM

## 2025-04-14 PROCEDURE — 1160F RVW MEDS BY RX/DR IN RCRD: CPT | Performed by: FAMILY MEDICINE

## 2025-04-14 PROCEDURE — 1036F TOBACCO NON-USER: CPT | Performed by: FAMILY MEDICINE

## 2025-04-14 PROCEDURE — 1123F ACP DISCUSS/DSCN MKR DOCD: CPT | Performed by: FAMILY MEDICINE

## 2025-04-14 PROCEDURE — 99214 OFFICE O/P EST MOD 30 MIN: CPT | Performed by: FAMILY MEDICINE

## 2025-04-14 PROCEDURE — 1111F DSCHRG MED/CURRENT MED MERGE: CPT | Performed by: FAMILY MEDICINE

## 2025-04-14 PROCEDURE — 1159F MED LIST DOCD IN RCRD: CPT | Performed by: FAMILY MEDICINE

## 2025-04-14 RX ORDER — FLUTICASONE PROPIONATE AND SALMETEROL 250; 50 UG/1; UG/1
1 POWDER RESPIRATORY (INHALATION)
Qty: 60 EACH | Refills: 2 | Status: SHIPPED | OUTPATIENT
Start: 2025-04-14

## 2025-04-14 RX ORDER — BENZONATATE 100 MG/1
100 CAPSULE ORAL 3 TIMES DAILY PRN
Qty: 20 CAPSULE | Refills: 0 | Status: SHIPPED | OUTPATIENT
Start: 2025-04-14

## 2025-04-14 RX ORDER — BENZONATATE 100 MG/1
100 CAPSULE ORAL 3 TIMES DAILY PRN
COMMUNITY
End: 2025-04-14 | Stop reason: SDUPTHER

## 2025-04-14 RX ORDER — FEXOFENADINE HCL 180 MG/1
180 TABLET ORAL DAILY
COMMUNITY

## 2025-04-14 NOTE — PATIENT INSTRUCTIONS
I ordered repeat labs and a repeat chest xray.  For now, we will keep you off antibiotics.  Follow up with pulmonology as scheduled.  Return in one month for a recheck and sooner if you have any new or worsened problems.

## 2025-04-14 NOTE — PROGRESS NOTES
"Subjective   Patient ID: [id number]     Nicolle Campbell is a 69 y.o. female who presents for Hospital Follow-up.  HPI  She is here for a post-hospitalization recheck.  She was hospitalized from 3/16/25 to 3/20/25 at [facility].  The DC summary from 3/20/25 was reviewed today.      She went to the hospital 3/16/25 complaining of shortness of breath.  She had fatigue, SOB and decreased appetite.  She had a productive cough with yellow sputum.  She had chest pain that worsened with coughing.  She had had a history of a right middle lobe collapse in November of 2024.    She was found to be tachypneic in the ER.  D diner was high.  Flu and covid testing were negative.  CTA was neg for PE, but revealed an endobronchial hypodensity with occlusion of the RML bronchus and the posterior segmental branch of the RLL.  She was treated for pneumonia withh cefepime, azithromycin, vancomycin, SoluMedrol and Benadryl in the ER.      She had a pulmonology consult.  Had a bronchoscopy on 3/19/25 showing erythematous cobblestoning of the mucosa.  Friable mucose noted in the RML.  Mucous plugs were seen obstructing the bronchi.  Bronchoaveolar lavage was performed in the RML.  She had cloudy purulent fluid noted from the area.  Brushings with microbiology  brush obtained in the RML.  Cultures were negative and pathology was \"nondiagnostic\".    She felt gradually each day.  She was sent home on antibiotics on 3/20/25.  She is still on Advair twice a day. She feels a lot better than she was but she is still feeling weak. She has a follow up with pulmonology in June.      Presently no cough.  No SOB.  Just a little weak. At about 80% improvement.    Her last chest xray was on 3/19/25.  It showed a \"mass like infiltrate\" in the right middle lobe.      She was discharged on clindamycin.  She had diarrhea but states it has gone away.    She returned to work on 4/7/25.    Tobacco Use: Low Risk  (4/14/2025)    Patient History     " Smoking Tobacco Use: Never     Smokeless Tobacco Use: Never     Passive Exposure: Not on file         Objective     /64 (BP Location: Right arm, Patient Position: Sitting)   Temp 36.5 °C (97.7 °F) (Skin) Comment (Src): Refused weight.     Physical Exam  Constitutional:       Appearance: Normal appearance.   Cardiovascular:      Rate and Rhythm: Normal rate and regular rhythm.      Heart sounds: Normal heart sounds. No murmur heard.  Pulmonary:      Effort: Pulmonary effort is normal. No respiratory distress.      Breath sounds: Normal breath sounds.   Neurological:      General: No focal deficit present.      Mental Status: She is alert and oriented to person, place, and time.         Assessment/Plan   Problem List Items Addressed This Visit       Pneumonia of right middle lobe due to infectious organism - Primary    Relevant Orders    CBC and Auto Differential    Comprehensive Metabolic Panel    XR chest 2 views     Other Visit Diagnoses       Bronchiectasis with acute lower respiratory infection        Relevant Orders    CBC and Auto Differential    Comprehensive Metabolic Panel    XR chest 2 views        I ordered repeat labs and a repeat chest xray.  For now, we will keep you off antibiotics.  Follow up with pulmonology as scheduled.  Return in one month for a recheck and sooner if you have any new or worsened problems.     Jacoby Conklin, DO

## 2025-04-15 LAB
ALBUMIN SERPL-MCNC: 4.5 G/DL (ref 3.6–5.1)
ALP SERPL-CCNC: 92 U/L (ref 37–153)
ALT SERPL-CCNC: 22 U/L (ref 6–29)
ANION GAP SERPL CALCULATED.4IONS-SCNC: 8 MMOL/L (CALC) (ref 7–17)
AST SERPL-CCNC: 24 U/L (ref 10–35)
BASOPHILS # BLD AUTO: 49 CELLS/UL (ref 0–200)
BASOPHILS NFR BLD AUTO: 0.7 %
BILIRUB SERPL-MCNC: 0.3 MG/DL (ref 0.2–1.2)
BUN SERPL-MCNC: 18 MG/DL (ref 7–25)
CALCIUM SERPL-MCNC: 9.9 MG/DL (ref 8.6–10.4)
CHLORIDE SERPL-SCNC: 104 MMOL/L (ref 98–110)
CO2 SERPL-SCNC: 26 MMOL/L (ref 20–32)
CREAT SERPL-MCNC: 0.85 MG/DL (ref 0.6–1)
EGFRCR SERPLBLD CKD-EPI 2021: 74 ML/MIN/1.73M2
EOSINOPHIL # BLD AUTO: 721 CELLS/UL (ref 15–500)
EOSINOPHIL NFR BLD AUTO: 10.3 %
ERYTHROCYTE [DISTWIDTH] IN BLOOD BY AUTOMATED COUNT: 14.7 % (ref 11–15)
GLUCOSE SERPL-MCNC: 85 MG/DL (ref 65–99)
HCT VFR BLD AUTO: 37.7 % (ref 35–45)
HGB BLD-MCNC: 11.9 G/DL (ref 11.7–15.5)
LYMPHOCYTES # BLD AUTO: 2436 CELLS/UL (ref 850–3900)
LYMPHOCYTES NFR BLD AUTO: 34.8 %
MCH RBC QN AUTO: 26.7 PG (ref 27–33)
MCHC RBC AUTO-ENTMCNC: 31.6 G/DL (ref 32–36)
MCV RBC AUTO: 84.7 FL (ref 80–100)
MONOCYTES # BLD AUTO: 819 CELLS/UL (ref 200–950)
MONOCYTES NFR BLD AUTO: 11.7 %
NEUTROPHILS # BLD AUTO: 2975 CELLS/UL (ref 1500–7800)
NEUTROPHILS NFR BLD AUTO: 42.5 %
PLATELET # BLD AUTO: 383 THOUSAND/UL (ref 140–400)
PMV BLD REES-ECKER: 10.2 FL (ref 7.5–12.5)
POTASSIUM SERPL-SCNC: 4.6 MMOL/L (ref 3.5–5.3)
PROT SERPL-MCNC: 7.2 G/DL (ref 6.1–8.1)
RBC # BLD AUTO: 4.45 MILLION/UL (ref 3.8–5.1)
SODIUM SERPL-SCNC: 138 MMOL/L (ref 135–146)
WBC # BLD AUTO: 7 THOUSAND/UL (ref 3.8–10.8)

## 2025-04-16 LAB
ACID FAST STN SPEC: NORMAL
MYCOBACTERIUM SPEC CULT: NORMAL

## 2025-04-21 ENCOUNTER — APPOINTMENT (OUTPATIENT)
Facility: CLINIC | Age: 70
End: 2025-04-21
Payer: MEDICARE

## 2025-04-23 LAB
ACID FAST STN SPEC: NORMAL
MYCOBACTERIUM SPEC CULT: NORMAL

## 2025-04-30 LAB
ACID FAST STN SPEC: NORMAL
MYCOBACTERIUM SPEC CULT: NORMAL

## 2025-05-01 ENCOUNTER — PATIENT OUTREACH (OUTPATIENT)
Dept: CARE COORDINATION | Facility: CLINIC | Age: 70
End: 2025-05-01
Payer: MEDICARE

## 2025-05-07 LAB
ACID FAST STN SPEC: NORMAL
MYCOBACTERIUM SPEC CULT: NORMAL

## 2025-05-15 ENCOUNTER — HOSPITAL ENCOUNTER (OUTPATIENT)
Dept: RADIOLOGY | Facility: HOSPITAL | Age: 70
Discharge: HOME | End: 2025-05-15
Payer: MEDICARE

## 2025-05-15 DIAGNOSIS — J18.9 PNEUMONIA OF RIGHT MIDDLE LOBE DUE TO INFECTIOUS ORGANISM: ICD-10-CM

## 2025-05-15 DIAGNOSIS — J47.0 BRONCHIECTASIS WITH ACUTE LOWER RESPIRATORY INFECTION: ICD-10-CM

## 2025-05-15 PROCEDURE — 71046 X-RAY EXAM CHEST 2 VIEWS: CPT | Performed by: RADIOLOGY

## 2025-05-15 PROCEDURE — 71046 X-RAY EXAM CHEST 2 VIEWS: CPT

## 2025-05-16 ENCOUNTER — APPOINTMENT (OUTPATIENT)
Dept: PRIMARY CARE | Facility: CLINIC | Age: 70
End: 2025-05-16
Payer: MEDICARE

## 2025-05-19 ENCOUNTER — APPOINTMENT (OUTPATIENT)
Dept: PRIMARY CARE | Facility: CLINIC | Age: 70
End: 2025-05-19
Payer: MEDICARE

## 2025-05-19 VITALS — TEMPERATURE: 97.8 F | SYSTOLIC BLOOD PRESSURE: 122 MMHG | DIASTOLIC BLOOD PRESSURE: 68 MMHG

## 2025-05-19 DIAGNOSIS — J18.9 PNEUMONIA OF RIGHT MIDDLE LOBE DUE TO INFECTIOUS ORGANISM: ICD-10-CM

## 2025-05-19 PROCEDURE — 1160F RVW MEDS BY RX/DR IN RCRD: CPT | Performed by: FAMILY MEDICINE

## 2025-05-19 PROCEDURE — 1036F TOBACCO NON-USER: CPT | Performed by: FAMILY MEDICINE

## 2025-05-19 PROCEDURE — 99214 OFFICE O/P EST MOD 30 MIN: CPT | Performed by: FAMILY MEDICINE

## 2025-05-19 PROCEDURE — 1159F MED LIST DOCD IN RCRD: CPT | Performed by: FAMILY MEDICINE

## 2025-05-19 RX ORDER — FLUTICASONE PROPIONATE AND SALMETEROL 250; 50 UG/1; UG/1
1 POWDER RESPIRATORY (INHALATION)
Qty: 60 EACH | Refills: 2 | Status: SHIPPED | OUTPATIENT
Start: 2025-05-19

## 2025-05-19 NOTE — PROGRESS NOTES
"Subjective   Patient ID: [id number]     Nicolle Campbell is a 69 y.o. female who presents for Follow-up (One month) and Med Refill.  HPI  She is here for a one month recheck.      She was seen here 4/14/25, after being in the hospital.      She had SOB.  She was found to have a lobar collapse of the RML.  She was treated with cefipime, azithromycin, vancomycin, solumedrol and benadryl.  She had a bronchoscopy which showed erythematous cobblestoning of the mucosa.  She had mucous plugs.  Brushings were obtained.  Cultures were negative and pathology was nondiagnostic.  She was discharged on clindamycin.     On 4/15, a repeat chest xray was ordered.  Labs were ordered.  She was to follow up with pulmonology.      Her chest xray was done on 5/15/25.  It showed significant improvement in her RML with only mild residual infiltrate there.  A repeat was suggested in 6 weeks to assure recovery.  CBC showed a normalized white count.  Mild eosinophilia was shown.      She is feeling better but not yet back to normal.  She is still on cough suppressants, mucinex and inhalers.  Has a cough.  Gets \"a little wheezy\" at night.  She states she is worried that it will come back again.          Objective     /68   Temp 36.6 °C (97.8 °F) (Skin)      Physical Exam  Constitutional:       General: She is not in acute distress.     Appearance: Normal appearance. She is not ill-appearing or toxic-appearing.   Cardiovascular:      Rate and Rhythm: Normal rate and regular rhythm.      Heart sounds: Normal heart sounds. No murmur heard.  Pulmonary:      Effort: Pulmonary effort is normal. No respiratory distress.      Breath sounds: Normal breath sounds. No wheezing, rhonchi or rales.   Neurological:      General: No focal deficit present.      Mental Status: She is alert and oriented to person, place, and time.         Assessment/Plan   Problem List Items Addressed This Visit       Pneumonia of right middle lobe due to infectious " organism    Relevant Medications    fluticasone propion-salmeteroL (Advair Diskus) 250-50 mcg/dose diskus inhaler     Your chest xray shows that your pneumonia is much improved.  It still shows a mild residual infiltrate in the right middle lobe.  I recommend that you get another chest xray in six weeks.  Then, make an appointment for a recheck.      Continue the same medications.  Return sooner if you get any worse.   Jacoby Conklin, DO

## 2025-05-19 NOTE — PATIENT INSTRUCTIONS
Your chest xray shows that your pneumonia is much improved.  It still shows a mild residual infiltrate in the right middle lobe.  I recommend that you get another chest xray in six weeks.  Then, make an appointment for a recheck.      Continue the same medications.  Return sooner if you get any worse.

## 2025-07-16 ENCOUNTER — HOSPITAL ENCOUNTER (OUTPATIENT)
Dept: RADIOLOGY | Facility: HOSPITAL | Age: 70
Discharge: HOME | End: 2025-07-16
Payer: MEDICARE

## 2025-07-16 DIAGNOSIS — J18.9 PNEUMONIA OF RIGHT MIDDLE LOBE DUE TO INFECTIOUS ORGANISM: ICD-10-CM

## 2025-07-16 PROCEDURE — 71046 X-RAY EXAM CHEST 2 VIEWS: CPT

## 2025-07-16 PROCEDURE — 71046 X-RAY EXAM CHEST 2 VIEWS: CPT | Performed by: RADIOLOGY

## 2025-07-24 ENCOUNTER — TELEPHONE (OUTPATIENT)
Dept: PRIMARY CARE | Facility: CLINIC | Age: 70
End: 2025-07-24
Payer: MEDICARE

## 2025-07-24 DIAGNOSIS — J45.41 MODERATE PERSISTENT ASTHMA WITH ACUTE EXACERBATION (HHS-HCC): ICD-10-CM

## 2025-07-24 DIAGNOSIS — J18.1: ICD-10-CM

## 2025-07-24 RX ORDER — MONTELUKAST SODIUM 10 MG/1
10 TABLET ORAL NIGHTLY
Qty: 30 TABLET | Refills: 1 | Status: SHIPPED | OUTPATIENT
Start: 2025-07-24 | End: 2026-07-24

## 2025-07-24 NOTE — TELEPHONE ENCOUNTER
REFILL  MEDICATION:     Montelukast 10 MG; Take 1 tablet daily at bedtime.     PHARM: [facility]   PHARM NUMBER: [phone]    LR: 4/1/25      30 tablets with 1 refill   LV: 5/19/25  NV: 7/28/25

## 2025-07-28 ENCOUNTER — APPOINTMENT (OUTPATIENT)
Dept: PRIMARY CARE | Facility: CLINIC | Age: 70
End: 2025-07-28
Payer: MEDICARE

## 2025-07-28 VITALS — DIASTOLIC BLOOD PRESSURE: 68 MMHG | TEMPERATURE: 97.9 F | SYSTOLIC BLOOD PRESSURE: 110 MMHG

## 2025-07-28 DIAGNOSIS — J18.9 PNEUMONIA OF RIGHT MIDDLE LOBE DUE TO INFECTIOUS ORGANISM: Primary | ICD-10-CM

## 2025-07-28 DIAGNOSIS — J45.41 MODERATE PERSISTENT ASTHMA WITH ACUTE EXACERBATION (HHS-HCC): ICD-10-CM

## 2025-07-28 PROCEDURE — 1159F MED LIST DOCD IN RCRD: CPT | Performed by: FAMILY MEDICINE

## 2025-07-28 PROCEDURE — 99214 OFFICE O/P EST MOD 30 MIN: CPT | Performed by: FAMILY MEDICINE

## 2025-07-28 PROCEDURE — 1036F TOBACCO NON-USER: CPT | Performed by: FAMILY MEDICINE

## 2025-07-28 PROCEDURE — 1160F RVW MEDS BY RX/DR IN RCRD: CPT | Performed by: FAMILY MEDICINE

## 2025-07-28 RX ORDER — BENZONATATE 100 MG/1
100 CAPSULE ORAL 3 TIMES DAILY PRN
Qty: 20 CAPSULE | Refills: 0 | Status: SHIPPED | OUTPATIENT
Start: 2025-07-28

## 2025-07-28 NOTE — PROGRESS NOTES
"Subjective   Patient ID: [id number]     Nicolle Campbell is a 70 y.o. female who presents for Follow-up, Results (CT scan), and Med Refill (Tessalon Perles).  HPI    She is here for a recheck.    She has been follows for \"mass like infiltrate of RML\" in March.  Chest xray was repeated and was improved in May and then resolved on chest xray 7/17/25.      She is feeling better but she does get bouts of SOB.  On Friday, she overdid it with cleaning in her house.  Friday, she became very short of breath.  She used her inhaler and aerosol machine.  She was SOB.  By Saturday, she felt better.      She had a similar attack over the last month or so, she was walking in [place] and became SOB and someone called the ambulance. They gave her oxygen and she felt better and she was not taken to the hospital.     She is seeing Dr Milan, cardiologist.   She sees Dr Roman, pulmonology, in September, shortly after getting a CT chest that has been ordered but not yet done.      Objective     /68   Temp 36.6 °C (97.9 °F) (Skin)      Physical Exam  Constitutional:       Appearance: Normal appearance.     Cardiovascular:      Rate and Rhythm: Normal rate and regular rhythm.      Heart sounds: Normal heart sounds. No murmur heard.  Pulmonary:      Effort: Pulmonary effort is normal. No respiratory distress.      Breath sounds: Normal breath sounds.     Neurological:      General: No focal deficit present.      Mental Status: She is alert and oriented to person, place, and time.         Assessment/Plan   Problem List Items Addressed This Visit       Asthma, moderate persistent ([facility]-HCC)    Pneumonia of right middle lobe due to infectious organism - Primary    Relevant Medications    benzonatate (Tessalon) 100 mg capsule     Your right middle lobe pneumonia appears to be improved.  You have overlying asthma, which can come and go on top of this.  Follow up with cardiology and pulmonology as scheduled.  Follow up with the CT scan " as scheduled in September.  Return after seeing Dr Roman, pulmonology, in September.  Return sooner if you feel any worse.   Jacoby Conklin, DO

## 2025-07-28 NOTE — PATIENT INSTRUCTIONS
Your right middle lobe pneumonia appears to be improved.  You have overlying asthma, which can come and go on top of this.  Follow up with cardiology and pulmonology as scheduled.  Follow up with the CT scan as scheduled in September.  Return after seeing Dr Roman, pulmonology, in September.  Return sooner if you feel any worse.

## 2025-08-07 LAB
NON-UH HIE CALCULATED LDL CHOLESTEROL: 216 MG/DL (ref 60–130)
NON-UH HIE CHOLESTEROL: 309 MG/DL (ref 100–200)
NON-UH HIE HDL CHOLESTEROL: 49 MG/DL (ref 40–60)
NON-UH HIE TOTAL CHOL/HDL CHOL RATIO: 6.3
NON-UH HIE TRIGLYCERIDES: 220 MG/DL (ref 30–150)

## 2025-08-19 DIAGNOSIS — Z12.31 ENCOUNTER FOR SCREENING MAMMOGRAM FOR BREAST CANCER: ICD-10-CM

## 2026-01-08 ENCOUNTER — APPOINTMENT (OUTPATIENT)
Dept: PRIMARY CARE | Facility: CLINIC | Age: 71
End: 2026-01-08
Payer: MEDICARE